# Patient Record
Sex: FEMALE | Race: WHITE | Employment: OTHER | URBAN - METROPOLITAN AREA
[De-identification: names, ages, dates, MRNs, and addresses within clinical notes are randomized per-mention and may not be internally consistent; named-entity substitution may affect disease eponyms.]

---

## 2017-11-30 ENCOUNTER — HOSPITAL ENCOUNTER (EMERGENCY)
Age: 82
Discharge: HOME | End: 2017-12-01
Attending: EMERGENCY MEDICINE
Payer: MEDICARE

## 2017-11-30 ENCOUNTER — APPOINTMENT (OUTPATIENT)
Dept: CT IMAGING | Age: 82
End: 2017-11-30
Attending: EMERGENCY MEDICINE
Payer: MEDICARE

## 2017-11-30 ENCOUNTER — APPOINTMENT (OUTPATIENT)
Dept: GENERAL RADIOLOGY | Age: 82
End: 2017-11-30
Attending: EMERGENCY MEDICINE
Payer: MEDICARE

## 2017-11-30 DIAGNOSIS — M25.511 ACUTE PAIN OF RIGHT SHOULDER: Primary | ICD-10-CM

## 2017-11-30 LAB
ANION GAP SERPL CALC-SCNC: 8 MMOL/L (ref 5–15)
BASOPHILS # BLD: 0.1 K/UL (ref 0–0.1)
BASOPHILS NFR BLD: 1 % (ref 0–1)
BUN SERPL-MCNC: 17 MG/DL (ref 6–20)
BUN/CREAT SERPL: 22 (ref 12–20)
CALCIUM SERPL-MCNC: 8.3 MG/DL (ref 8.5–10.1)
CHLORIDE SERPL-SCNC: 104 MMOL/L (ref 97–108)
CK SERPL-CCNC: 129 U/L (ref 26–192)
CO2 SERPL-SCNC: 26 MMOL/L (ref 21–32)
CREAT SERPL-MCNC: 0.77 MG/DL (ref 0.55–1.02)
EOSINOPHIL # BLD: 0.1 K/UL (ref 0–0.4)
EOSINOPHIL NFR BLD: 1 % (ref 0–7)
ERYTHROCYTE [DISTWIDTH] IN BLOOD BY AUTOMATED COUNT: 13.8 % (ref 11.5–14.5)
GLUCOSE SERPL-MCNC: 144 MG/DL (ref 65–100)
HCT VFR BLD AUTO: 37.5 % (ref 35–47)
HGB BLD-MCNC: 12.1 G/DL (ref 11.5–16)
LYMPHOCYTES # BLD: 1.6 K/UL (ref 0.8–3.5)
LYMPHOCYTES NFR BLD: 19 % (ref 12–49)
MCH RBC QN AUTO: 29 PG (ref 26–34)
MCHC RBC AUTO-ENTMCNC: 32.3 G/DL (ref 30–36.5)
MCV RBC AUTO: 89.9 FL (ref 80–99)
MONOCYTES # BLD: 0.6 K/UL (ref 0–1)
MONOCYTES NFR BLD: 7 % (ref 5–13)
NEUTS SEG # BLD: 6.1 K/UL (ref 1.8–8)
NEUTS SEG NFR BLD: 72 % (ref 32–75)
PLATELET # BLD AUTO: 226 K/UL (ref 150–400)
POTASSIUM SERPL-SCNC: 3.4 MMOL/L (ref 3.5–5.1)
RBC # BLD AUTO: 4.17 M/UL (ref 3.8–5.2)
SODIUM SERPL-SCNC: 138 MMOL/L (ref 136–145)
TROPONIN I SERPL-MCNC: <0.04 NG/ML
WBC # BLD AUTO: 8.4 K/UL (ref 3.6–11)

## 2017-11-30 PROCEDURE — 96374 THER/PROPH/DIAG INJ IV PUSH: CPT

## 2017-11-30 PROCEDURE — 93005 ELECTROCARDIOGRAM TRACING: CPT

## 2017-11-30 PROCEDURE — 96375 TX/PRO/DX INJ NEW DRUG ADDON: CPT

## 2017-11-30 PROCEDURE — A4565 SLINGS: HCPCS

## 2017-11-30 PROCEDURE — 74011250637 HC RX REV CODE- 250/637: Performed by: EMERGENCY MEDICINE

## 2017-11-30 PROCEDURE — 80048 BASIC METABOLIC PNL TOTAL CA: CPT | Performed by: EMERGENCY MEDICINE

## 2017-11-30 PROCEDURE — 74011250637 HC RX REV CODE- 250/637: Performed by: PHYSICIAN ASSISTANT

## 2017-11-30 PROCEDURE — 99285 EMERGENCY DEPT VISIT HI MDM: CPT

## 2017-11-30 PROCEDURE — 82550 ASSAY OF CK (CPK): CPT | Performed by: EMERGENCY MEDICINE

## 2017-11-30 PROCEDURE — 73200 CT UPPER EXTREMITY W/O DYE: CPT

## 2017-11-30 PROCEDURE — 85025 COMPLETE CBC W/AUTO DIFF WBC: CPT | Performed by: EMERGENCY MEDICINE

## 2017-11-30 PROCEDURE — 74011250636 HC RX REV CODE- 250/636: Performed by: EMERGENCY MEDICINE

## 2017-11-30 PROCEDURE — 36415 COLL VENOUS BLD VENIPUNCTURE: CPT | Performed by: EMERGENCY MEDICINE

## 2017-11-30 PROCEDURE — 73030 X-RAY EXAM OF SHOULDER: CPT

## 2017-11-30 PROCEDURE — 96376 TX/PRO/DX INJ SAME DRUG ADON: CPT

## 2017-11-30 PROCEDURE — 84484 ASSAY OF TROPONIN QUANT: CPT | Performed by: EMERGENCY MEDICINE

## 2017-11-30 RX ORDER — FENTANYL CITRATE 50 UG/ML
25 INJECTION, SOLUTION INTRAMUSCULAR; INTRAVENOUS
Status: COMPLETED | OUTPATIENT
Start: 2017-11-30 | End: 2017-11-30

## 2017-11-30 RX ORDER — HYDROMORPHONE HYDROCHLORIDE 2 MG/ML
0.5 INJECTION, SOLUTION INTRAMUSCULAR; INTRAVENOUS; SUBCUTANEOUS
Status: COMPLETED | OUTPATIENT
Start: 2017-11-30 | End: 2017-11-30

## 2017-11-30 RX ORDER — ASPIRIN 81 MG/1
81 TABLET ORAL DAILY
COMMUNITY

## 2017-11-30 RX ORDER — ONDANSETRON 2 MG/ML
4 INJECTION INTRAMUSCULAR; INTRAVENOUS
Status: COMPLETED | OUTPATIENT
Start: 2017-11-30 | End: 2017-11-30

## 2017-11-30 RX ORDER — DOCUSATE SODIUM 100 MG/1
100 CAPSULE, LIQUID FILLED ORAL 2 TIMES DAILY
Qty: 28 CAP | Refills: 0 | Status: SHIPPED | OUTPATIENT
Start: 2017-11-30 | End: 2017-12-14

## 2017-11-30 RX ORDER — ACETAMINOPHEN 325 MG/1
650 TABLET ORAL
Status: COMPLETED | OUTPATIENT
Start: 2017-11-30 | End: 2017-11-30

## 2017-11-30 RX ORDER — POTASSIUM CHLORIDE 1.5 G/1.77G
20 POWDER, FOR SOLUTION ORAL
Status: COMPLETED | OUTPATIENT
Start: 2017-11-30 | End: 2017-12-01

## 2017-11-30 RX ORDER — HYDROMORPHONE HYDROCHLORIDE 2 MG/1
2 TABLET ORAL
Qty: 18 TAB | Refills: 0 | Status: SHIPPED | OUTPATIENT
Start: 2017-11-30

## 2017-11-30 RX ORDER — LIDOCAINE 50 MG/G
1 PATCH TOPICAL
Status: DISCONTINUED | OUTPATIENT
Start: 2017-11-30 | End: 2017-12-01 | Stop reason: HOSPADM

## 2017-11-30 RX ADMIN — ACETAMINOPHEN 650 MG: 325 TABLET ORAL at 19:08

## 2017-11-30 RX ADMIN — FENTANYL CITRATE 25 MCG: 50 INJECTION, SOLUTION INTRAMUSCULAR; INTRAVENOUS at 21:09

## 2017-11-30 RX ADMIN — FENTANYL CITRATE 25 MCG: 50 INJECTION, SOLUTION INTRAMUSCULAR; INTRAVENOUS at 20:11

## 2017-11-30 RX ADMIN — ONDANSETRON 4 MG: 2 INJECTION INTRAMUSCULAR; INTRAVENOUS at 21:07

## 2017-11-30 RX ADMIN — HYDROMORPHONE HYDROCHLORIDE 0.5 MG: 2 INJECTION, SOLUTION INTRAMUSCULAR; INTRAVENOUS; SUBCUTANEOUS at 23:42

## 2017-12-01 VITALS
BODY MASS INDEX: 29.44 KG/M2 | RESPIRATION RATE: 24 BRPM | WEIGHT: 160 LBS | HEIGHT: 62 IN | HEART RATE: 67 BPM | TEMPERATURE: 97.8 F | SYSTOLIC BLOOD PRESSURE: 166 MMHG | DIASTOLIC BLOOD PRESSURE: 71 MMHG | OXYGEN SATURATION: 94 %

## 2017-12-01 LAB
ATRIAL RATE: 54 BPM
ATRIAL RATE: 62 BPM
CALCULATED P AXIS, ECG09: -18 DEGREES
CALCULATED P AXIS, ECG09: 18 DEGREES
CALCULATED R AXIS, ECG10: 17 DEGREES
CALCULATED R AXIS, ECG10: 5 DEGREES
CALCULATED T AXIS, ECG11: 47 DEGREES
DIAGNOSIS, 93000: NORMAL
DIAGNOSIS, 93000: NORMAL
P-R INTERVAL, ECG05: 100 MS
P-R INTERVAL, ECG05: 144 MS
Q-T INTERVAL, ECG07: 418 MS
Q-T INTERVAL, ECG07: 560 MS
QRS DURATION, ECG06: 82 MS
QRS DURATION, ECG06: 94 MS
QTC CALCULATION (BEZET), ECG08: 396 MS
QTC CALCULATION (BEZET), ECG08: 568 MS
VENTRICULAR RATE, ECG03: 54 BPM
VENTRICULAR RATE, ECG03: 62 BPM

## 2017-12-01 PROCEDURE — 74011250637 HC RX REV CODE- 250/637: Performed by: EMERGENCY MEDICINE

## 2017-12-01 RX ADMIN — POTASSIUM CHLORIDE 20 MEQ: 1.5 POWDER, FOR SOLUTION ORAL at 00:23

## 2017-12-01 NOTE — CONSULTS
ORTHOPAEDIC CONSULT NOTE    Subjective:     Date of Consultation:  2017      Jennifer Medina is a 80 y.o. female who is being seen for right shoulder pain. Pt reports acute onset of sharp/stabbing anterior shoulder pain approx 5PM PTA. Denies injury or trauma. Pt. Denies head trauma/LOC during injury. Pt. Is right hand dominant. Lives at home with . No improvement with two doses of IV pain meds. There are no active problems to display for this patient. History reviewed. No pertinent family history. Social History   Substance Use Topics    Smoking status: Never Smoker    Smokeless tobacco: Not on file    Alcohol use No     Past Medical History:   Diagnosis Date    Hypertension     Ill-defined condition     high cholesterol      History reviewed. No pertinent surgical history. Prior to Admission medications    Medication Sig Start Date End Date Taking? Authorizing Provider   aspirin delayed-release 81 mg tablet Take 81 mg by mouth daily. Yes Phys Other, MD   SIMVASTATIN PO Take  by mouth. Yes Phys Other, MD   OTHER bp med   Yes Phys Other, MD     No current facility-administered medications for this encounter. Current Outpatient Prescriptions   Medication Sig    aspirin delayed-release 81 mg tablet Take 81 mg by mouth daily.  SIMVASTATIN PO Take  by mouth.  OTHER bp med      Allergies   Allergen Reactions    Codeine Other (comments)     aggitation        Review of Systems:  A comprehensive review of systems was negative except for that written in the HPI.     Mental Status: no dementia    Objective:     Patient Vitals for the past 8 hrs:   BP Temp Pulse Resp SpO2 Height Weight   17 2100 159/89 - 68 15 96 % - -   17 188/73 - - - - - -   17 1929 192/70 - (!) 52 16 98 % - -   17 1849 (!) 203/110 97.8 °F (36.6 °C) 62 24 94 % 5' 2\" (1.575 m) 72.6 kg (160 lb)     Temp (24hrs), Av.8 °F (36.6 °C), Min:97.8 °F (36.6 °C), Max:97.8 °F (36.6 °C)      Gen: Well-developed,  in no acute distress   HEENT: Pink conjunctivae, hearing intact to voice, moist mucous membranes   Neck: Supple  Resp: No respiratory distress   Card: RRR, palpable distal pulse-equal bilaterally, birsk cap refill all distal digits   Abd: Soft, non-distended  Musc: right shoulder with subtle anterior prominence. NO AC or SC TTP. Guarded active motion, passive ext rotation 40 v/s 70, elevation 110 v/s 150, neurovascular exam intact UE bilat. 5/5 MMT of the biceps, triceps, wrist ext, finger flexors, bell press. 4/5 strength with MMT of the deltoid and ext rotation with pain on the right. unable ti test flexion at 90 due to pain. Left UE 5/5 throughout. I am able to depress the humeral head with gentle anterior pressure, with caused worsening pain, release of pressure-lessened pain. Excellent cervical ROM w/o foraminal stenosis signs  Skin: No skin breakdown noted. Skin warm, pink, dry  Neuro: Cranial nerves are grossly intact, no focal motor weakness, follows commands appropriately   Psych: Good insight, oriented to person, place and time, alert    Imaging Review: XR SHOULDER RT AP/LAT MIN 2 V   INDICATION:   Right shoulder pain.   COMPARISON: None.   FINDINGS: Three views of the right shoulder demonstrate inferior subluxation of  the humerus relative to the glenoid. On scapular Y view, however, the glenoid  does not appear to be anteriorly dislocated. This may represent  pseudosubluxation due to a large joint effusion or bursal fluid collection.     IMPRESSION: Inferior pseudosubluxation of the right humerus.     Labs:   Recent Results (from the past 24 hour(s))   EKG, 12 LEAD, INITIAL    Collection Time: 11/30/17  6:54 PM   Result Value Ref Range    Ventricular Rate 62 BPM    Atrial Rate 62 BPM    P-R Interval 100 ms    QRS Duration 82 ms    Q-T Interval 560 ms    QTC Calculation (Bezet) 568 ms    Calculated P Axis -18 degrees    Calculated R Axis 5 degrees    Diagnosis       Sinus rhythm with sinus arrhythmia with short [place]  Nonspecific ST and T wave abnormality  Abnormal ECG  No previous ECGs available     EKG, 12 LEAD, INITIAL    Collection Time: 11/30/17  7:06 PM   Result Value Ref Range    Ventricular Rate 54 BPM    Atrial Rate 54 BPM    P-R Interval 144 ms    QRS Duration 94 ms    Q-T Interval 418 ms    QTC Calculation (Bezet) 396 ms    Calculated P Axis 18 degrees    Calculated R Axis 17 degrees    Calculated T Axis 47 degrees    Diagnosis       Sinus bradycardia with sinus arrhythmia  Nonspecific T wave abnormality  Abnormal ECG  No previous ECGs available     CBC WITH AUTOMATED DIFF    Collection Time: 11/30/17  7:17 PM   Result Value Ref Range    WBC 8.4 3.6 - 11.0 K/uL    RBC 4.17 3.80 - 5.20 M/uL    HGB 12.1 11.5 - 16.0 g/dL    HCT 37.5 35.0 - 47.0 %    MCV 89.9 80.0 - 99.0 FL    MCH 29.0 26.0 - 34.0 PG    MCHC 32.3 30.0 - 36.5 g/dL    RDW 13.8 11.5 - 14.5 %    PLATELET 841 499 - 237 K/uL    NEUTROPHILS 72 32 - 75 %    LYMPHOCYTES 19 12 - 49 %    MONOCYTES 7 5 - 13 %    EOSINOPHILS 1 0 - 7 %    BASOPHILS 1 0 - 1 %    ABS. NEUTROPHILS 6.1 1.8 - 8.0 K/UL    ABS. LYMPHOCYTES 1.6 0.8 - 3.5 K/UL    ABS. MONOCYTES 0.6 0.0 - 1.0 K/UL    ABS. EOSINOPHILS 0.1 0.0 - 0.4 K/UL    ABS.  BASOPHILS 0.1 0.0 - 0.1 K/UL   METABOLIC PANEL, BASIC    Collection Time: 11/30/17  7:17 PM   Result Value Ref Range    Sodium 138 136 - 145 mmol/L    Potassium 3.4 (L) 3.5 - 5.1 mmol/L    Chloride 104 97 - 108 mmol/L    CO2 26 21 - 32 mmol/L    Anion gap 8 5 - 15 mmol/L    Glucose 144 (H) 65 - 100 mg/dL    BUN 17 6 - 20 MG/DL    Creatinine 0.77 0.55 - 1.02 MG/DL    BUN/Creatinine ratio 22 (H) 12 - 20      GFR est AA >60 >60 ml/min/1.73m2    GFR est non-AA >60 >60 ml/min/1.73m2    Calcium 8.3 (L) 8.5 - 10.1 MG/DL   TROPONIN I    Collection Time: 11/30/17  7:17 PM   Result Value Ref Range    Troponin-I, Qt. <0.04 <0.05 ng/mL   CK W/ REFLX CKMB    Collection Time: 11/30/17  7:17 PM   Result Value Ref Range     26 - 192 U/L             Plan:     Right shoulder pseudo dislocation   Recommend CT scan   Immobilize in sling   F/U with Dr Mignon Enamorado tomorrow v/s Dr Jacqueline Crowley Monday   Call [facility] office in the morning [phone]      Dr. Mignon Enamorado aware and agrees with plan as above. [facility]     Atraumatic shoulder dislocation inferiorly supports possibility of chronic dislocation  Agree with plan for CT scan, sling and swathe with office follow-up.     [address]

## 2017-12-01 NOTE — DISCHARGE INSTRUCTIONS
Joint Pain: Care Instructions  Your Care Instructions    Many people have small aches and pains from overuse or injury to muscles and joints. Joint injuries often happen during sports or recreation, work tasks, or projects around the home. An overuse injury can happen when you put too much stress on a joint or when you do an activity that stresses the joint over and over, such as using the computer or rowing a boat. You can take action at home to help your muscles and joints get better. You should feel better in 1 to 2 weeks, but it can take 3 months or more to heal completely. Follow-up care is a key part of your treatment and safety. Be sure to make and go to all appointments, and call your doctor if you are having problems. It's also a good idea to know your test results and keep a list of the medicines you take. How can you care for yourself at home? · Do not put weight on the injured joint for at least a day or two. · For the first day or two after an injury, do not take hot showers or baths, and do not use hot packs. The heat could make swelling worse. · Put ice or a cold pack on the sore joint for 10 to 20 minutes at a time. Try to do this every 1 to 2 hours for the next 3 days (when you are awake) or until the swelling goes down. Put a thin cloth between the ice and your skin. · Wrap the injury in an elastic bandage. Do not wrap it too tightly because this can cause more swelling. · Prop up the sore joint on a pillow when you ice it or anytime you sit or lie down during the next 3 days. Try to keep it above the level of your heart. This will help reduce swelling. · Take an over-the-counter pain medicine, such as acetaminophen (Tylenol), ibuprofen (Advil, Motrin), or naproxen (Aleve). Read and follow all instructions on the label. · After 1 or 2 days of rest, begin moving the joint gently.  While the joint is still healing, you can begin to exercise using activities that do not strain or hurt the painful joint. When should you call for help? Call your doctor now or seek immediate medical care if:  ? · You have signs of infection, such as:  ¨ Increased pain, swelling, warmth, and redness. ¨ Red streaks leading from the joint. ¨ A fever. ? Watch closely for changes in your health, and be sure to contact your doctor if:  ? · Your movement or symptoms are not getting better after 1 to 2 weeks of home treatment. Where can you learn more? Go to http://christina-cam.info/. Enter P205 in the search box to learn more about \"Joint Pain: Care Instructions. \"  Current as of: March 21, 2017  Content Version: 11.4  © 3586-1255 [employer]. Care instructions adapted under license by [employer] (which disclaims liability or warranty for this information). If you have questions about a medical condition or this instruction, always ask your healthcare professional. [address] any warranty or liability for your use of this information.

## 2017-12-01 NOTE — ED PROVIDER NOTES
EMERGENCY DEPARTMENT HISTORY AND PHYSICAL EXAM      Date: 11/30/2017  Patient Name: Gertrudis Ravi    History of Presenting Illness     Chief Complaint   Patient presents with    Arm Pain     RIGHT ARM       History Provided By: Patient    HPI: Gertrudis Ravi, 80 y.o. female with PMHx significant for HTN and hypercholesterolemia, presents via EMS to the ED with cc of an acute onset of constant, sharp, aching, right shoulder pain worsening since 1700 this evening. The pt reports associated sx of nausea. She states at the onset of sx she was putting on a jacket and began to feel discomfort. She expresses that her pain is exacerbated with any movement of the RUE and has found no relief leading her to the ED. The pt ensures that she is right hand dominant. She denies any recent falls or trauma to the affected area. She denies any fevers, chills, chest pain, SOB, jaw pain, abdominal pain, vomiting, diarrhea, or headache. PCP: Bibiana Gonzalez MD   Specialist: Agustin Link. MD Kala (orthopedic)     There are no other complaints, changes, or physical findings at this time. Current Facility-Administered Medications   Medication Dose Route Frequency Provider Last Rate Last Dose    lidocaine (LIDODERM) 5 % patch 1 Patch  1 Patch TransDERmal NOW Digna Chávez. Paul Middleton MD   1 Patch at 11/30/17 2233     Current Outpatient Prescriptions   Medication Sig Dispense Refill    aspirin delayed-release 81 mg tablet Take 81 mg by mouth daily.  HYDROmorphone (DILAUDID) 2 mg tablet Take 1 Tab by mouth every four (4) hours as needed for Pain (breakthrough pain). Max Daily Amount: 12 mg. Indications: take with stool softener 18 Tab 0    docusate sodium (COLACE) 100 mg capsule Take 1 Cap by mouth two (2) times a day for 14 days. Indications: take with narcotic pain medication 28 Cap 0    SIMVASTATIN PO Take  by mouth.       OTHER bp med         Past History     Past Medical History:  Past Medical History:   Diagnosis Date    Hypertension     Ill-defined condition     high cholesterol       Past Surgical History:  History reviewed. No pertinent surgical history. Family History:  History reviewed. No pertinent family history. Social History:  Social History   Substance Use Topics    Smoking status: Never Smoker    Smokeless tobacco: None    Alcohol use No       Allergies: Allergies   Allergen Reactions    Codeine Other (comments)     aggitation         Review of Systems   Review of Systems   Constitutional: Negative for chills and fever. HENT: Negative for congestion. Eyes: Negative for visual disturbance. Respiratory: Negative for chest tightness and shortness of breath. Cardiovascular: Negative for chest pain and leg swelling. Gastrointestinal: Positive for nausea. Negative for abdominal pain, diarrhea and vomiting. Endocrine: Negative for polyuria. Genitourinary: Negative for dysuria and frequency. Musculoskeletal: Positive for arthralgias (right shoulder ). Negative for myalgias. Skin: Negative for color change. Allergic/Immunologic: Negative for immunocompromised state. Neurological: Negative for numbness and headaches. Physical Exam   Physical Exam  Nursing note and vitals reviewed.   General appearance: non-toxic  Eyes: PERRL, EOMI, conjunctiva normal, anicteric sclera  HEENT: mucous membranes moist, oropharynx is clear  Pulmonary: clear to auscultation bilaterally  Cardiac: normal rate and regular rhythm, no murmurs, gallops, or rubs, 2+DP pulses, 2+ radial pulses  Abdomen: soft, nontender, nondistended, bowel sounds present  MSK: no pre-tibial edema, TTP over R GH joint, limited AROM R shoulder 2/2 pain  Neuro: Alert, answers questions appropriately; sensation intact to the axillary nerve;  is symmetric, sensation to light touch intact to the BL forearms and the LUE  Skin: capillary refill brisk, no rash or erythema of RUE/shoulder    Diagnostic Study Results     Labs -     Recent Results (from the past 12 hour(s))   EKG, 12 LEAD, INITIAL    Collection Time: 11/30/17  6:54 PM   Result Value Ref Range    Ventricular Rate 62 BPM    Atrial Rate 62 BPM    P-R Interval 100 ms    QRS Duration 82 ms    Q-T Interval 560 ms    QTC Calculation (Bezet) 568 ms    Calculated P Axis -18 degrees    Calculated R Axis 5 degrees    Diagnosis       Sinus rhythm with sinus arrhythmia with short [place]  Nonspecific ST and T wave abnormality  Abnormal ECG  No previous ECGs available     EKG, 12 LEAD, INITIAL    Collection Time: 11/30/17  7:06 PM   Result Value Ref Range    Ventricular Rate 54 BPM    Atrial Rate 54 BPM    P-R Interval 144 ms    QRS Duration 94 ms    Q-T Interval 418 ms    QTC Calculation (Bezet) 396 ms    Calculated P Axis 18 degrees    Calculated R Axis 17 degrees    Calculated T Axis 47 degrees    Diagnosis       Sinus bradycardia with sinus arrhythmia  Nonspecific T wave abnormality  Abnormal ECG  No previous ECGs available     CBC WITH AUTOMATED DIFF    Collection Time: 11/30/17  7:17 PM   Result Value Ref Range    WBC 8.4 3.6 - 11.0 K/uL    RBC 4.17 3.80 - 5.20 M/uL    HGB 12.1 11.5 - 16.0 g/dL    HCT 37.5 35.0 - 47.0 %    MCV 89.9 80.0 - 99.0 FL    MCH 29.0 26.0 - 34.0 PG    MCHC 32.3 30.0 - 36.5 g/dL    RDW 13.8 11.5 - 14.5 %    PLATELET 039 006 - 963 K/uL    NEUTROPHILS 72 32 - 75 %    LYMPHOCYTES 19 12 - 49 %    MONOCYTES 7 5 - 13 %    EOSINOPHILS 1 0 - 7 %    BASOPHILS 1 0 - 1 %    ABS. NEUTROPHILS 6.1 1.8 - 8.0 K/UL    ABS. LYMPHOCYTES 1.6 0.8 - 3.5 K/UL    ABS. MONOCYTES 0.6 0.0 - 1.0 K/UL    ABS. EOSINOPHILS 0.1 0.0 - 0.4 K/UL    ABS.  BASOPHILS 0.1 0.0 - 0.1 K/UL   METABOLIC PANEL, BASIC    Collection Time: 11/30/17  7:17 PM   Result Value Ref Range    Sodium 138 136 - 145 mmol/L    Potassium 3.4 (L) 3.5 - 5.1 mmol/L    Chloride 104 97 - 108 mmol/L    CO2 26 21 - 32 mmol/L    Anion gap 8 5 - 15 mmol/L    Glucose 144 (H) 65 - 100 mg/dL    BUN 17 6 - 20 MG/DL    Creatinine 0.77 0.55 - 1.02 MG/DL    BUN/Creatinine ratio 22 (H) 12 - 20      GFR est AA >60 >60 ml/min/1.73m2    GFR est non-AA >60 >60 ml/min/1.73m2    Calcium 8.3 (L) 8.5 - 10.1 MG/DL   TROPONIN I    Collection Time: 11/30/17  7:17 PM   Result Value Ref Range    Troponin-I, Qt. <0.04 <0.05 ng/mL   CK W/ REFLX CKMB    Collection Time: 11/30/17  7:17 PM   Result Value Ref Range     26 - 192 U/L       Radiologic Studies -   XR SHOULDER RT AP/LAT MIN 2 V   Final Result   EXAM:  XR SHOULDER RT AP/LAT MIN 2 V     INDICATION:   Right shoulder pain.     COMPARISON: None.     FINDINGS: Three views of the right shoulder demonstrate inferior subluxation of  the humerus relative to the glenoid. On scapular Y view, however, the glenoid  does not appear to be anteriorly dislocated. This may represent  pseudosubluxation due to a large joint effusion or bursal fluid collection.     IMPRESSION  IMPRESSION: Inferior pseudosubluxation of the right humerus. Preliminary report was provided by Dr. Eri Jaime, the on-call radiologist, at     Final report to follow.     No fracture or dislocation, chronic tuberosity deformity. Medical Decision Making   I am the first provider for this patient. I reviewed the vital signs, available nursing notes, past medical history, past surgical history, family history and social history. Vital Signs-Reviewed the patient's vital signs. Patient Vitals for the past 12 hrs:   Temp Pulse Resp BP SpO2   12/01/17 0027 - 67 24 166/71 94 %   11/30/17 2341 - 76 19 180/81 93 %   11/30/17 2254 - 77 - (!) 128/98 95 %   11/30/17 2100 - 68 15 159/89 96 %   11/30/17 2001 - - - 188/73 -   11/30/17 1929 - (!) 52 16 192/70 98 %   11/30/17 1849 97.8 °F (36.6 °C) 62 24 (!) 203/110 94 %     EKG interpretation: (Preliminary) 1906  Rhythm: sinus bradycardia with sinus arrhythmia; and regular . Rate (approx.): 54; Axis: normal ; ST/T wave: no ST elevation, no ST depression, non-specific T-wave abnormality; Other: no previous EKG's. MT interval 144 ms, QRS 94 ms,  ms, QTc 396 ms. Written by Ray Kilgore ED Scribe, as dictated by Manuelito Luther MD.    Records Reviewed: Old Medical Records    Provider Notes (Medical Decision Making):     DDx: shoulder subluxation/ dislocation, rotator cuff tear, fracture, low suspicion for ACS or other intrathoracic abnormality . ED Course:   Initial assessment performed. The patients presenting problems have been discussed, and they are in agreement with the care plan formulated and outlined with them. I have encouraged them to ask questions as they arise throughout their visit. Progress Notes:     PROGRESS NOTE:  10:12 PM  Pt has been re-evaluated. The pt has been evaluated by Nuvia Bajwa PA-C who ensures that the pt is stable for discharge with follow up in office. Written by SHIRA Garcia, as dictated by Richelle Salcido. Tiara Teresa MD.     11:15 PM  Spoke with Nuvia Bajwa PA-C who spoke with Dr. Praveen Melendez (orthoepedic surgeon). Dr. Praveen Melendez recommended CT of the right shoulder and a sling for comfort. She may be seen by Dr. Praveen Melendez tomorrow at the [facility] or Monday, 12/4/17 by Dr. Ver[facility] Velasquez [facility] [address] [facility]. Films reviewed by Dr. Praveen Melendez, per Olive Kingston. Written by SHIRA Tadeo, as dictated by Chico Burnett MD.      Disposition:  Discharge Note:  1:15 AM   The patient is ready for discharge. The patient's signs, symptoms, diagnosis, and discharge instruction have been discussed and the patient has conveyed their understanding. The patient is to follow up as recommended or return to the ER should their symptoms worsen. Plan has been discussed and the patient is in agreement. Written by Sera Dsouza ED Scribe, as dictated by Richelle Salcido. Tiara Teresa MD     PLAN:  1. Current Discharge Medication List      START taking these medications    Details   HYDROmorphone (DILAUDID) 2 mg tablet Take 1 Tab by mouth every four (4) hours as needed for Pain (breakthrough pain). Max Daily Amount: 12 mg. Indications: take with stool softener  Qty: 18 Tab, Refills: 0      docusate sodium (COLACE) 100 mg capsule Take 1 Cap by mouth two (2) times a day for 14 days. Indications: take with narcotic pain medication  Qty: 28 Cap, Refills: 0         CONTINUE these medications which have NOT CHANGED    Details   aspirin delayed-release 81 mg tablet Take 81 mg by mouth daily. SIMVASTATIN PO Take  by mouth. OTHER bp med           2. Follow-up Information     Follow up With Details Comments Contact Info    Ray Borrego MD Call today DR. REGALADO CAN SEE YOU TODAY; PLEASE CALL EARLY IN THE MORNING FOR AN APPOINTMENT TIME [address] [facility] [address]  [address]  [address], [place] Call in 3 days OR YOU MAY BE SEEN NEXT WEEK BY DR. [facility] [address]  [address] [address]  [phone]      [facility] EMERGENCY DEPT Go in 1 day If symptoms worsen [address] [facility] [id number] [phone]        Return to ED if worse     Diagnosis     Clinical Impression:   1. Acute pain of right shoulder        Attestations:    Attestation: This note is prepared by Kristi Dsouza, acting as Scribe for Juany Chapman. Esperanza Franco MD.      Juany Chapman. Esperanza Franco MD: The scribe's documentation has been prepared under my direction and personally reviewed by me in its entirety. I confirm that the note above accurately reflects all work, treatment, procedures, and medical decision making performed by me.

## 2017-12-01 NOTE — ED NOTES
Patient discharged and given discharge instructions by Orly Valdez MD. Patient had an opportunity to ask questions. Patient verbalized understanding of discharge instructions. Patient d/c from ED in a wheelchair, discharge instructions and prescriptions in hand. Patient accompanied by . Pt stated she was nauseous, but refused nausea medication. Pt requested an emesis bag to take home. Pt given an emesis bag.

## 2017-12-01 NOTE — PROGRESS NOTES
Contacted patient. Reviewed CT final read. Encouraged follow up. Pt had an Orthopedist prior to her arrival, but states she has not been entirely happy with her care to date. She was referred to Dr. Heather Albrecht last evening. She has not scheduled an appt yet, as she had such a late night in the ED last evening. Advised Dr. Hanna Chaudhry office has access to [facility] care and can review her CT. She will contact them for follow up.

## 2017-12-01 NOTE — ED NOTES
Patient presents to ED with C/O right arm that started today. The pt stated she was at a gathering when the pain the started. The pt stated she fell forward 6 months ago on a piece of cement, but denies the right side hurting more than the left. The pt stated she was bruised all over. The pt denies numbness/tingling, weakness, chest pain, SOB, dizziness, lifting heavy objects, falling, N/V/D, fever, chills, and urinary symptoms. Patient is A&Ox3, side rails up, call bell w/in reach, and aware of plan of care. The patient is requesting more pain medication.  at the bedside. Pt on the monitor x4. Pt stated she did not take her BP medication today.

## 2017-12-08 NOTE — ED NOTES
Spoke to pt regarding CT findings. She has ortho f/u next week. Advised pcp f/u regarding incompletely seen liver lesion. Pt agreed to f/u w/ pcp.

## 2024-08-06 ENCOUNTER — TELEPHONE (OUTPATIENT)
Age: 89
End: 2024-08-06

## 2024-08-06 NOTE — TELEPHONE ENCOUNTER
----- Message from Divina Wilson sent at 8/5/2024  8:31 AM EDT -----  Regarding: [facility] Appointment Request  [facility] Appointment Request    Patient needs appointment for [facility] Appointment Type: New Patient.    Patient Requested Dates(s): Within August  Patient Requested Time:in the morning 9-10 am   Provider Name: Carlos Romo    Reason for Appointment Request: New Patient - Requested Provider unavailable  --------------------------------------------------------------------------------------------------------------------------    Relationship to Patient: Covered Entity/ Caregiver/ Sylwia     Call Back Information: OK to leave message on voicemail  Preferred Call Back Number: Phone [phone]

## 2025-01-22 ENCOUNTER — APPOINTMENT (OUTPATIENT)
Facility: HOSPITAL | Age: 89
End: 2025-01-22
Payer: MEDICARE

## 2025-01-22 ENCOUNTER — HOSPITAL ENCOUNTER (INPATIENT)
Facility: HOSPITAL | Age: 89
LOS: 3 days | Discharge: HOSPICE | End: 2025-01-25
Attending: STUDENT IN AN ORGANIZED HEALTH CARE EDUCATION/TRAINING PROGRAM | Admitting: STUDENT IN AN ORGANIZED HEALTH CARE EDUCATION/TRAINING PROGRAM
Payer: MEDICARE

## 2025-01-22 DIAGNOSIS — Z51.5 PALLIATIVE CARE ENCOUNTER: ICD-10-CM

## 2025-01-22 DIAGNOSIS — I61.9 CVA (CEREBROVASCULAR ACCIDENT DUE TO INTRACEREBRAL HEMORRHAGE) (HCC): Primary | ICD-10-CM

## 2025-01-22 LAB
ALBUMIN SERPL-MCNC: 3.5 G/DL (ref 3.5–5)
ALBUMIN/GLOB SERPL: 1 (ref 1.1–2.2)
ALP SERPL-CCNC: 57 U/L (ref 45–117)
ALT SERPL-CCNC: 21 U/L (ref 12–78)
ANION GAP SERPL CALC-SCNC: 5 MMOL/L (ref 2–12)
APPEARANCE UR: CLEAR
AST SERPL-CCNC: 40 U/L (ref 15–37)
B PERT DNA SPEC QL NAA+PROBE: NOT DETECTED
BACTERIA URNS QL MICRO: ABNORMAL /HPF
BASOPHILS # BLD: 0.05 K/UL (ref 0–0.1)
BASOPHILS NFR BLD: 0.7 % (ref 0–1)
BILIRUB SERPL-MCNC: 0.5 MG/DL (ref 0.2–1)
BILIRUB UR QL: NEGATIVE
BORDETELLA PARAPERTUSSIS BY PCR: NOT DETECTED
BUN SERPL-MCNC: 16 MG/DL (ref 6–20)
BUN/CREAT SERPL: 21 (ref 12–20)
C PNEUM DNA SPEC QL NAA+PROBE: NOT DETECTED
CALCIUM SERPL-MCNC: 8.6 MG/DL (ref 8.5–10.1)
CHLORIDE SERPL-SCNC: 101 MMOL/L (ref 97–108)
CO2 SERPL-SCNC: 26 MMOL/L (ref 21–32)
COLOR UR: ABNORMAL
COMMENT:: NORMAL
CREAT SERPL-MCNC: 0.76 MG/DL (ref 0.55–1.02)
DIFFERENTIAL METHOD BLD: NORMAL
EOSINOPHIL # BLD: 0.07 K/UL (ref 0–0.4)
EOSINOPHIL NFR BLD: 0.9 % (ref 0–7)
EPITH CASTS URNS QL MICRO: ABNORMAL /LPF
ERYTHROCYTE [DISTWIDTH] IN BLOOD BY AUTOMATED COUNT: 13.2 % (ref 11.5–14.5)
FLUAV SUBTYP SPEC NAA+PROBE: NOT DETECTED
FLUBV RNA SPEC QL NAA+PROBE: NOT DETECTED
GLOBULIN SER CALC-MCNC: 3.5 G/DL (ref 2–4)
GLUCOSE SERPL-MCNC: 105 MG/DL (ref 65–100)
GLUCOSE UR STRIP.AUTO-MCNC: 100 MG/DL
HADV DNA SPEC QL NAA+PROBE: NOT DETECTED
HCOV 229E RNA SPEC QL NAA+PROBE: NOT DETECTED
HCOV HKU1 RNA SPEC QL NAA+PROBE: NOT DETECTED
HCOV NL63 RNA SPEC QL NAA+PROBE: NOT DETECTED
HCOV OC43 RNA SPEC QL NAA+PROBE: NOT DETECTED
HCT VFR BLD AUTO: 41.3 % (ref 35–47)
HGB BLD-MCNC: 13.9 G/DL (ref 11.5–16)
HGB UR QL STRIP: NEGATIVE
HMPV RNA SPEC QL NAA+PROBE: NOT DETECTED
HPIV1 RNA SPEC QL NAA+PROBE: NOT DETECTED
HPIV2 RNA SPEC QL NAA+PROBE: NOT DETECTED
HPIV3 RNA SPEC QL NAA+PROBE: NOT DETECTED
HPIV4 RNA SPEC QL NAA+PROBE: NOT DETECTED
IMM GRANULOCYTES # BLD AUTO: 0.03 K/UL (ref 0–0.04)
IMM GRANULOCYTES NFR BLD AUTO: 0.4 % (ref 0–0.5)
INR PPP: 1 (ref 0.9–1.1)
KETONES UR QL STRIP.AUTO: ABNORMAL MG/DL
LEUKOCYTE ESTERASE UR QL STRIP.AUTO: ABNORMAL
LYMPHOCYTES # BLD: 1.92 K/UL (ref 0.8–3.5)
LYMPHOCYTES NFR BLD: 25.2 % (ref 12–49)
M PNEUMO DNA SPEC QL NAA+PROBE: NOT DETECTED
MCH RBC QN AUTO: 31.6 PG (ref 26–34)
MCHC RBC AUTO-ENTMCNC: 33.7 G/DL (ref 30–36.5)
MCV RBC AUTO: 93.9 FL (ref 80–99)
MONOCYTES # BLD: 0.66 K/UL (ref 0–1)
MONOCYTES NFR BLD: 8.7 % (ref 5–13)
NEUTS SEG # BLD: 4.9 K/UL (ref 1.8–8)
NEUTS SEG NFR BLD: 64.1 % (ref 32–75)
NITRITE UR QL STRIP.AUTO: NEGATIVE
NRBC # BLD: 0 K/UL (ref 0–0.01)
NRBC BLD-RTO: 0 PER 100 WBC
PH UR STRIP: 8.5 (ref 5–8)
PLATELET # BLD AUTO: 268 K/UL (ref 150–400)
PMV BLD AUTO: 10.3 FL (ref 8.9–12.9)
POTASSIUM SERPL-SCNC: 4.3 MMOL/L (ref 3.5–5.1)
PROT SERPL-MCNC: 7 G/DL (ref 6.4–8.2)
PROT UR STRIP-MCNC: NEGATIVE MG/DL
PROTHROMBIN TIME: 11.1 SEC (ref 9.2–11.2)
RBC # BLD AUTO: 4.4 M/UL (ref 3.8–5.2)
RBC #/AREA URNS HPF: ABNORMAL /HPF (ref 0–5)
RSV RNA SPEC QL NAA+PROBE: NOT DETECTED
RV+EV RNA SPEC QL NAA+PROBE: NOT DETECTED
SARS-COV-2 RNA RESP QL NAA+PROBE: NOT DETECTED
SODIUM SERPL-SCNC: 132 MMOL/L (ref 136–145)
SP GR UR REFRACTOMETRY: 1.01 (ref 1–1.03)
SPECIMEN HOLD: NORMAL
TROPONIN I SERPL HS-MCNC: 10 NG/L (ref 0–51)
URINE CULTURE IF INDICATED: ABNORMAL
UROBILINOGEN UR QL STRIP.AUTO: 0.2 EU/DL (ref 0.2–1)
WBC # BLD AUTO: 7.6 K/UL (ref 3.6–11)
WBC URNS QL MICRO: ABNORMAL /HPF (ref 0–4)

## 2025-01-22 PROCEDURE — 6360000002 HC RX W HCPCS

## 2025-01-22 PROCEDURE — 2580000003 HC RX 258: Performed by: STUDENT IN AN ORGANIZED HEALTH CARE EDUCATION/TRAINING PROGRAM

## 2025-01-22 PROCEDURE — 0202U NFCT DS 22 TRGT SARS-COV-2: CPT

## 2025-01-22 PROCEDURE — 36415 COLL VENOUS BLD VENIPUNCTURE: CPT

## 2025-01-22 PROCEDURE — 81001 URINALYSIS AUTO W/SCOPE: CPT

## 2025-01-22 PROCEDURE — 96375 TX/PRO/DX INJ NEW DRUG ADDON: CPT

## 2025-01-22 PROCEDURE — 80053 COMPREHEN METABOLIC PANEL: CPT

## 2025-01-22 PROCEDURE — 6360000004 HC RX CONTRAST MEDICATION: Performed by: RADIOLOGY

## 2025-01-22 PROCEDURE — 70496 CT ANGIOGRAPHY HEAD: CPT

## 2025-01-22 PROCEDURE — 6360000002 HC RX W HCPCS: Performed by: STUDENT IN AN ORGANIZED HEALTH CARE EDUCATION/TRAINING PROGRAM

## 2025-01-22 PROCEDURE — 93005 ELECTROCARDIOGRAM TRACING: CPT | Performed by: STUDENT IN AN ORGANIZED HEALTH CARE EDUCATION/TRAINING PROGRAM

## 2025-01-22 PROCEDURE — 96374 THER/PROPH/DIAG INJ IV PUSH: CPT

## 2025-01-22 PROCEDURE — 2000000000 HC ICU R&B

## 2025-01-22 PROCEDURE — 99285 EMERGENCY DEPT VISIT HI MDM: CPT

## 2025-01-22 PROCEDURE — 85025 COMPLETE CBC W/AUTO DIFF WBC: CPT

## 2025-01-22 PROCEDURE — 2500000003 HC RX 250 WO HCPCS

## 2025-01-22 PROCEDURE — 84484 ASSAY OF TROPONIN QUANT: CPT

## 2025-01-22 PROCEDURE — 96365 THER/PROPH/DIAG IV INF INIT: CPT

## 2025-01-22 PROCEDURE — 70450 CT HEAD/BRAIN W/O DYE: CPT

## 2025-01-22 PROCEDURE — 85610 PROTHROMBIN TIME: CPT

## 2025-01-22 RX ORDER — SODIUM CHLORIDE 9 MG/ML
50 INJECTION, SOLUTION INTRAVENOUS ONCE
Status: DISCONTINUED | OUTPATIENT
Start: 2025-01-22 | End: 2025-01-22

## 2025-01-22 RX ORDER — LABETALOL HYDROCHLORIDE 5 MG/ML
10 INJECTION, SOLUTION INTRAVENOUS EVERY 10 MIN PRN
Status: DISCONTINUED | OUTPATIENT
Start: 2025-01-22 | End: 2025-01-23

## 2025-01-22 RX ORDER — PROCHLORPERAZINE EDISYLATE 5 MG/ML
10 INJECTION INTRAMUSCULAR; INTRAVENOUS EVERY 6 HOURS PRN
Status: DISCONTINUED | OUTPATIENT
Start: 2025-01-22 | End: 2025-01-23

## 2025-01-22 RX ORDER — ONDANSETRON 2 MG/ML
4 INJECTION INTRAMUSCULAR; INTRAVENOUS EVERY 6 HOURS PRN
Status: DISCONTINUED | OUTPATIENT
Start: 2025-01-22 | End: 2025-01-23

## 2025-01-22 RX ORDER — SODIUM CHLORIDE 9 MG/ML
50 INJECTION, SOLUTION INTRAVENOUS ONCE
Status: COMPLETED | OUTPATIENT
Start: 2025-01-22 | End: 2025-01-22

## 2025-01-22 RX ORDER — ACETAMINOPHEN 650 MG/1
650 SUPPOSITORY RECTAL EVERY 6 HOURS PRN
Status: DISCONTINUED | OUTPATIENT
Start: 2025-01-22 | End: 2025-01-23

## 2025-01-22 RX ORDER — LEVETIRACETAM 500 MG/5ML
2 INJECTION, SOLUTION, CONCENTRATE INTRAVENOUS
Status: COMPLETED | OUTPATIENT
Start: 2025-01-22 | End: 2025-01-22

## 2025-01-22 RX ORDER — SODIUM CHLORIDE 0.9 % (FLUSH) 0.9 %
5-40 SYRINGE (ML) INJECTION EVERY 12 HOURS SCHEDULED
Status: DISCONTINUED | OUTPATIENT
Start: 2025-01-22 | End: 2025-01-23

## 2025-01-22 RX ORDER — SODIUM CHLORIDE 9 MG/ML
INJECTION, SOLUTION INTRAVENOUS PRN
Status: DISCONTINUED | OUTPATIENT
Start: 2025-01-22 | End: 2025-01-23

## 2025-01-22 RX ORDER — ACETAMINOPHEN 325 MG/1
650 TABLET ORAL EVERY 6 HOURS PRN
Status: DISCONTINUED | OUTPATIENT
Start: 2025-01-22 | End: 2025-01-23

## 2025-01-22 RX ORDER — IOPAMIDOL 755 MG/ML
100 INJECTION, SOLUTION INTRAVASCULAR
Status: DISCONTINUED | OUTPATIENT
Start: 2025-01-22 | End: 2025-01-23

## 2025-01-22 RX ORDER — SODIUM CHLORIDE 0.9 % (FLUSH) 0.9 %
5-40 SYRINGE (ML) INJECTION PRN
Status: DISCONTINUED | OUTPATIENT
Start: 2025-01-22 | End: 2025-01-23

## 2025-01-22 RX ORDER — IOPAMIDOL 755 MG/ML
100 INJECTION, SOLUTION INTRAVASCULAR
Status: COMPLETED | OUTPATIENT
Start: 2025-01-22 | End: 2025-01-22

## 2025-01-22 RX ORDER — ONDANSETRON 4 MG/1
4 TABLET, ORALLY DISINTEGRATING ORAL EVERY 8 HOURS PRN
Status: DISCONTINUED | OUTPATIENT
Start: 2025-01-22 | End: 2025-01-23

## 2025-01-22 RX ADMIN — PROCHLORPERAZINE EDISYLATE 10 MG: 5 INJECTION INTRAMUSCULAR; INTRAVENOUS at 19:13

## 2025-01-22 RX ADMIN — SODIUM CHLORIDE 50 ML: 9 INJECTION, SOLUTION INTRAVENOUS at 16:37

## 2025-01-22 RX ADMIN — LEVETIRACETAM 2000 MG: 100 INJECTION INTRAVENOUS at 16:33

## 2025-01-22 RX ADMIN — NICARDIPINE HYDROCHLORIDE 5 MG/HR: 0.1 INJECTION, SOLUTION INTRAVENOUS at 15:46

## 2025-01-22 RX ADMIN — ONDANSETRON 4 MG: 2 INJECTION INTRAMUSCULAR; INTRAVENOUS at 18:37

## 2025-01-22 RX ADMIN — SODIUM CHLORIDE, PRESERVATIVE FREE 10 ML: 5 INJECTION INTRAVENOUS at 20:47

## 2025-01-22 RX ADMIN — IOPAMIDOL 80 ML: 755 INJECTION, SOLUTION INTRAVENOUS at 16:00

## 2025-01-22 RX ADMIN — LABETALOL HYDROCHLORIDE 10 MG: 5 INJECTION INTRAVENOUS at 15:43

## 2025-01-22 RX ADMIN — PROTHROMBIN COMPLEX CONCENTRATE (HUMAN) 2160 UNITS: 25.5; 16.5; 24; 22; 22; 26 POWDER, FOR SOLUTION INTRAVENOUS at 16:25

## 2025-01-22 ASSESSMENT — LIFESTYLE VARIABLES: HOW OFTEN DO YOU HAVE A DRINK CONTAINING ALCOHOL: PATIENT UNABLE TO ANSWER

## 2025-01-22 NOTE — ED NOTES
Pharmacy to CT with patient.     15:43: 10mg Labetolol IV given at this time.     15:47: Nicardipine 5mg IV infusion began at this time.    /127    15:48: 10mg Labetolol IV given at this time.   /113.

## 2025-01-22 NOTE — ED NOTES
Patient's hearing aids and gold necklace at bedside in clear bag with patient label. Patient's shirt, pants, and jacket at bedside with patient label.

## 2025-01-22 NOTE — ED PROVIDER NOTES
[facility] EMERGENCY DEPARTMENT  EMERGENCY DEPARTMENT ENCOUNTER      Pt Name: Jessica Pimentel  MRN: [id number]  Birthdate 8/25/1932  Date of evaluation: 1/22/2025  Provider: Ellis Shook MD    CHIEF COMPLAINT       Chief Complaint   Patient presents with    Aphasia    Extremity Weakness         HISTORY OF PRESENT ILLNESS   (Location/Symptom, Timing/Onset, Context/Setting, Quality, Duration, Modifying Factors, Severity)  Note limiting factors.   Patient is a 89-year-old female presenting to the emergency department for concerns of stroke.  Patient's last well-known was approximately 2 PM when patient called 911 with complaint of right arm weakness.  EMS on arrival stated that patient was having aphasia altered mental status borderline unresponsive.  On arrival here patient is awake not oriented moving left arm and left leg spontaneously with left-sided gaze.  Patient has a history of atrial fibrillation is on Eliquis EMS also noted that patient was extremely hypertensive with initial blood pressure 215/114.  On arrival here patient's blood pressure 223/118 patient went emergently to CT for imaging as a Code S level 1.            Review of External Medical Records:     Nursing Notes were reviewed.    REVIEW OF SYSTEMS    (2-9 systems for level 4, 10 or more for level 5)     Review of Systems    Except as noted above the remainder of the review of systems was reviewed and negative.       PAST MEDICAL HISTORY   No past medical history on file.      SURGICAL HISTORY     No past surgical history on file.      CURRENT MEDICATIONS       Previous Medications    ASPIRIN 81 MG EC TABLET    Take 1 tablet by mouth daily    ATORVASTATIN (LIPITOR) 40 MG TABLET    Take 1 tablet by mouth daily    ELIQUIS 2.5 MG TABS TABLET    Take 1 tablet by mouth 2 times daily    METHOCARBAMOL (ROBAXIN) 500 MG TABLET    Take 1 tablet by mouth 4 times daily    VALSARTAN (DIOVAN) 40 MG TABLET    Take 1 tablet by mouth daily       ALLERGIES    occasionally words are mis-transcribed.)    Ellis Shook MD (electronically signed)  Emergency Attending Physician / Physician Assistant / Nurse Practitioner      Perfect Serve Consult for Admission  5:27 PM    ED Room Number: ER15/15  Patient Name and age:  Jessica Pimentel 89 y.o.  female  Working Diagnosis:   1. CVA (cerebrovascular accident due to intracerebral hemorrhage) (HCC)        COVID-19 Suspicion: No  Sepsis present:  No  Reassessment needed: Yes  Code Status:  Full Code  Readmission: No  Isolation Requirements: no  Recommended Level of Care: ICU  Department: [facility] Adult ED - [phone]  Consulting Provider: NSGY, Teleneurology, ICU    Other:       Total critical care time spent exclusive of procedures:  70 minutes.          Ellis Shook MD  01/22/25 5218

## 2025-01-22 NOTE — ACP (ADVANCE CARE PLANNING)
Discussed current situation with patient son/POA Delgado. Discussed current risk of deterioration given significant size of intracranial hemorrhage in setting of anticoagulation use and severe altered mental status. Delgado states that his mother would not want to be on life support or receive aggressive life saving measures including CPR and intubation. He wishes to make his mother DNR/DNI. Orders placed in chart to reflect this change. Opportunities provided for questions and all were answered.

## 2025-01-22 NOTE — PROGRESS NOTES
ICH Documentation Note     Brief HPI: 89 year old female who presents from home with aphasia, right sided weakness.     Medical hx Atrial fibrillation on eliquis       Traumatic ICH? NO   ICH Score ON   ARRIVAL:   3    Imagin25 CT Head   Large left frontoparietal parenchymal hemorrhage with small  associated subarachnoid hemorrhage in surrounding vasogenic edema and mass effect with 5 mm rightward midline shift       Plan: Neurosurgery Consult   SBP < 140        Taty Siu, APRN - CNP  Neurovascular Nurse Practitioner

## 2025-01-22 NOTE — ED TRIAGE NOTES
Patient arrives to the ED from home for complaints of aphasia, R sided weakness, R facial droop. Patient arrives to the ED with R sided weakness, L sided gaze.     Patient was at home with her  and caretaker when they said at 1445 today she told them she could not move her R arm and was short of breath. LKW 1445.    EMS denies any recent falls. Patient does take Eliquis. Patient is a full code. .

## 2025-01-22 NOTE — H&P
CRITICAL CARE ADMISSION NOTE      Name: Jessica Pimentel   : 1932   MRN: [id number]   Date: 2025      Reason for ICU Admission: IPH, SAH      HISTORY OF PRESENT ILLNESS:   Jessica Pimentel is a 89 F w/ hx of Afib on Eliquis, HTN, HLD presenting to the ED via EMS today for acute onset R sided weakness and aphasia onset today. Per report, patient was at home with her  and caregivers around 1445 this afternoon when she notified caregivers of inability to move her right arm. EMS was called for transport and code stroke called upon arrival to the ED. Dry CT head revealed large left frontoparietal intraparenchymal hemorrhage with small amount of SAH and 5mm rightward shift. Neurosurgery and ICU teams consulted for evaluation. Patient admitted to ICU for further evaluation and management.     At time of my interview patient is aphasic. Follows simple commands in the left upper extremity. Flaccid right side. Eyes open but with leftward gaze. History is otherwise limited.     Review of Systems   Reason unable to perform ROS: altered mental status.         Past Medical History:      has no past medical history on file.    Past Surgical History:      has no past surgical history on file.    Home Medications:     Prior to Admission medications    Medication Sig Start Date End Date Taking? Authorizing Provider   aspirin 81 MG EC tablet Take 1 tablet by mouth daily    Mary Christy MD   ELIQUIS 2.5 MG TABS tablet Take 1 tablet by mouth 2 times daily 23   Mary Christy MD   atorvastatin (LIPITOR) 40 MG tablet Take 1 tablet by mouth daily    Mary Christy MD   methocarbamol (ROBAXIN) 500 MG tablet Take 1 tablet by mouth 4 times daily    Mary Christy MD   valsartan (DIOVAN) 40 MG tablet Take 1 tablet by mouth daily    Mary Christy MD       Reviewed 25  Allergies/Social/Family History:     Allergies   Allergen Reactions    Codeine Other (See Comments)     Other

## 2025-01-22 NOTE — ED NOTES
TRANSFER - OUT REPORT:    Verbal report given to BRENNAN Hernandez on Jessica Pimentel  being transferred to ICU 7112 for routine progression of patient care       Report consisted of patient's Situation, Background, Assessment and   Recommendations(SBAR).     Information from the following report(s) Nurse Handoff Report, Index, ED Encounter Summary, ED SBAR, Adult Overview, Intake/Output, MAR, Recent Results, Cardiac Rhythm A-fib, and Neuro Assessment was reviewed with the receiving nurse.    [place] Fall Assessment:    Presents to emergency department  because of falls (Syncope, seizure, or loss of consciousness): No  Age > 70: Yes  Altered Mental Status, Intoxication with alcohol or substance confusion (Disorientation, impaired judgment, poor safety awaremess, or inability to follow instructions): Yes  Impaired Mobility: Ambulates or transfers with assistive devices or assistance; Unable to ambulate or transer.: Yes  Nursing Judgement: Yes          Lines:   Peripheral IV 01/22/25 Left Antecubital (Active)       Peripheral IV 01/22/25 Right Antecubital (Active)       Peripheral IV 01/22/25 Right Antecubital (Active)        Opportunity for questions and clarification was provided.      Patient transported with:  Monitor, O2 @ 2lpm, and Registered Nurse

## 2025-01-23 PROBLEM — R40.0 SOMNOLENCE: Status: ACTIVE | Noted: 2025-01-23

## 2025-01-23 PROBLEM — R11.10 VOMITING: Status: ACTIVE | Noted: 2025-01-23

## 2025-01-23 PROBLEM — Z51.5 PALLIATIVE CARE ENCOUNTER: Status: ACTIVE | Noted: 2025-01-23

## 2025-01-23 LAB
ANION GAP SERPL CALC-SCNC: 10 MMOL/L (ref 2–12)
BUN SERPL-MCNC: 17 MG/DL (ref 6–20)
BUN/CREAT SERPL: 26 (ref 12–20)
CALCIUM SERPL-MCNC: 9.1 MG/DL (ref 8.5–10.1)
CHLORIDE SERPL-SCNC: 101 MMOL/L (ref 97–108)
CHOLEST SERPL-MCNC: 190 MG/DL
CO2 SERPL-SCNC: 23 MMOL/L (ref 21–32)
CREAT SERPL-MCNC: 0.65 MG/DL (ref 0.55–1.02)
EKG DIAGNOSIS: NORMAL
EKG Q-T INTERVAL: 366 MS
EKG QRS DURATION: 86 MS
EKG QTC CALCULATION (BAZETT): 442 MS
EKG R AXIS: 79 DEGREES
EKG T AXIS: 22 DEGREES
EKG VENTRICULAR RATE: 88 BPM
ERYTHROCYTE [DISTWIDTH] IN BLOOD BY AUTOMATED COUNT: 13.3 % (ref 11.5–14.5)
EST. AVERAGE GLUCOSE BLD GHB EST-MCNC: 117 MG/DL
GLUCOSE BLD STRIP.AUTO-MCNC: 129 MG/DL (ref 65–117)
GLUCOSE SERPL-MCNC: 133 MG/DL (ref 65–100)
HBA1C MFR BLD: 5.7 % (ref 4–5.6)
HCT VFR BLD AUTO: 42.1 % (ref 35–47)
HDLC SERPL-MCNC: 67 MG/DL
HDLC SERPL: 2.8 (ref 0–5)
HGB BLD-MCNC: 14 G/DL (ref 11.5–16)
LDLC SERPL CALC-MCNC: 107.2 MG/DL (ref 0–100)
MAGNESIUM SERPL-MCNC: 2.2 MG/DL (ref 1.6–2.4)
MCH RBC QN AUTO: 30.2 PG (ref 26–34)
MCHC RBC AUTO-ENTMCNC: 33.3 G/DL (ref 30–36.5)
MCV RBC AUTO: 90.9 FL (ref 80–99)
NRBC # BLD: 0 K/UL (ref 0–0.01)
NRBC BLD-RTO: 0 PER 100 WBC
PHOSPHATE SERPL-MCNC: 3.4 MG/DL (ref 2.6–4.7)
PLATELET # BLD AUTO: 264 K/UL (ref 150–400)
PMV BLD AUTO: 10.4 FL (ref 8.9–12.9)
POTASSIUM SERPL-SCNC: 3.5 MMOL/L (ref 3.5–5.1)
RBC # BLD AUTO: 4.63 M/UL (ref 3.8–5.2)
SERVICE CMNT-IMP: ABNORMAL
SODIUM SERPL-SCNC: 134 MMOL/L (ref 136–145)
TRIGL SERPL-MCNC: 79 MG/DL
VLDLC SERPL CALC-MCNC: 15.8 MG/DL
WBC # BLD AUTO: 12.3 K/UL (ref 3.6–11)

## 2025-01-23 PROCEDURE — 80061 LIPID PANEL: CPT

## 2025-01-23 PROCEDURE — 85027 COMPLETE CBC AUTOMATED: CPT

## 2025-01-23 PROCEDURE — 93010 ELECTROCARDIOGRAM REPORT: CPT | Performed by: SPECIALIST

## 2025-01-23 PROCEDURE — APPNB60 APP NON BILLABLE TIME 46-60 MINS

## 2025-01-23 PROCEDURE — 99223 1ST HOSP IP/OBS HIGH 75: CPT | Performed by: STUDENT IN AN ORGANIZED HEALTH CARE EDUCATION/TRAINING PROGRAM

## 2025-01-23 PROCEDURE — 83735 ASSAY OF MAGNESIUM: CPT

## 2025-01-23 PROCEDURE — 80048 BASIC METABOLIC PNL TOTAL CA: CPT

## 2025-01-23 PROCEDURE — 6360000002 HC RX W HCPCS: Performed by: STUDENT IN AN ORGANIZED HEALTH CARE EDUCATION/TRAINING PROGRAM

## 2025-01-23 PROCEDURE — 83036 HEMOGLOBIN GLYCOSYLATED A1C: CPT

## 2025-01-23 PROCEDURE — 84100 ASSAY OF PHOSPHORUS: CPT

## 2025-01-23 PROCEDURE — 2500000003 HC RX 250 WO HCPCS

## 2025-01-23 PROCEDURE — 6360000002 HC RX W HCPCS: Performed by: FAMILY MEDICINE

## 2025-01-23 PROCEDURE — 99223 1ST HOSP IP/OBS HIGH 75: CPT | Performed by: PHYSICAL MEDICINE & REHABILITATION

## 2025-01-23 PROCEDURE — 99223 1ST HOSP IP/OBS HIGH 75: CPT | Performed by: INTERNAL MEDICINE

## 2025-01-23 PROCEDURE — 1100000000 HC RM PRIVATE

## 2025-01-23 PROCEDURE — 36415 COLL VENOUS BLD VENIPUNCTURE: CPT

## 2025-01-23 PROCEDURE — 82962 GLUCOSE BLOOD TEST: CPT

## 2025-01-23 PROCEDURE — 2580000003 HC RX 258: Performed by: STUDENT IN AN ORGANIZED HEALTH CARE EDUCATION/TRAINING PROGRAM

## 2025-01-23 RX ORDER — POLYETHYLENE GLYCOL 3350 17 G/17G
17 POWDER, FOR SOLUTION ORAL DAILY PRN
Status: DISCONTINUED | OUTPATIENT
Start: 2025-01-23 | End: 2025-01-25 | Stop reason: HOSPADM

## 2025-01-23 RX ORDER — LORAZEPAM 2 MG/ML
1 INJECTION INTRAMUSCULAR EVERY 6 HOURS PRN
Status: DISCONTINUED | OUTPATIENT
Start: 2025-01-23 | End: 2025-01-25 | Stop reason: HOSPADM

## 2025-01-23 RX ORDER — HYDRALAZINE HYDROCHLORIDE 20 MG/ML
10 INJECTION INTRAMUSCULAR; INTRAVENOUS EVERY 6 HOURS PRN
Status: DISCONTINUED | OUTPATIENT
Start: 2025-01-23 | End: 2025-01-25 | Stop reason: HOSPADM

## 2025-01-23 RX ORDER — ONDANSETRON 2 MG/ML
4 INJECTION INTRAMUSCULAR; INTRAVENOUS EVERY 6 HOURS PRN
Status: DISCONTINUED | OUTPATIENT
Start: 2025-01-23 | End: 2025-01-25 | Stop reason: HOSPADM

## 2025-01-23 RX ORDER — GLYCOPYRROLATE 0.2 MG/ML
0.2 INJECTION INTRAMUSCULAR; INTRAVENOUS EVERY 4 HOURS PRN
Status: DISCONTINUED | OUTPATIENT
Start: 2025-01-23 | End: 2025-01-25 | Stop reason: HOSPADM

## 2025-01-23 RX ADMIN — PANTOPRAZOLE SODIUM 40 MG: 40 INJECTION, POWDER, FOR SOLUTION INTRAVENOUS at 11:20

## 2025-01-23 RX ADMIN — HYDRALAZINE HYDROCHLORIDE 10 MG: 20 INJECTION INTRAMUSCULAR; INTRAVENOUS at 17:29

## 2025-01-23 RX ADMIN — SODIUM CHLORIDE, PRESERVATIVE FREE 10 ML: 5 INJECTION INTRAVENOUS at 08:36

## 2025-01-23 NOTE — PLAN OF CARE
Problem: Safety - Adult  Goal: Free from fall injury  Outcome: Progressing  Flowsheets (Taken 1/22/2025 2000)  Free From Fall Injury: Instruct family/caregiver on patient safety     Problem: Discharge Planning  Goal: Discharge to home or other facility with appropriate resources  Outcome: Progressing  Flowsheets (Taken 1/22/2025 2000)  Discharge to home or other facility with appropriate resources: Identify barriers to discharge with patient and caregiver     Problem: Pain  Goal: Verbalizes/displays adequate comfort level or baseline comfort level  Outcome: Progressing  Flowsheets (Taken 1/22/2025 2000)  Verbalizes/displays adequate comfort level or baseline comfort level: Assess pain using appropriate pain scale

## 2025-01-23 NOTE — PROGRESS NOTES
CRITICAL CARE PROGRESS NOTE    Name: Jessica Pimentel   : 1932   MRN: [id number]   Date: 2025      ICU Diagnosis    Acute IPH  Acute SAH  Altered Mental Status    Overnight Events   Admitted - please see HPI.     ROS negative except as otherwise documented.     A/P   This is a 89 year old female with history of HTN, AFIB (eliquis) and HLD who presented to the ED with RHB weakness - admitted to the ICU after CTH revealed large left frontoparietal IPH with small SAH and 5 mm rightward shift.     NEUROLOGICAL:    Large frontoparietal IPH with SAH and midline shift. On AC outpatient - post reversal.   - NSU consulted  - neurology consulted  - SBP < 160  - Q2 neuro checks  - interval CT in N/V improves  - PT/OT/SLP  - hold AC/AP  - GOC    PULMONOLOGY:   2L NC. Likely secondary to mentation. CXR not acute.   - goal SpO2>=92, pH>=7.30  - O2 PRN; wean as tolerated    CARDIOVASCULAR:   History of FIB. Not in shock. No TTE for review.   - MAP > 65  - consider TTE  - hold home eliquis  - hold home BP meds     RENAL/ELECTROLYTE:   Renal function baseline.   - goal K>=4, Mg>=2, Phos >3  - daily BMP  - strict I/O's; daily weights  - avoid nephrotoxic medications    ENDOCRINE:   - SSI    ID/MICRO:   No signs or symptoms of active infection  - monitor off ABX    ICU DAILY CHECKLIST     Code Status: DNR  DVT Prophylaxis: SCDs  T/L/D: PIV  SUP: N/A  Diet: NPO  ABCDEF Bundle/Checklist Completed:Yes  Disposition: Stay in ICU  Multidisciplinary Rounds Completed:  Yes  Patient/Family Updated: Yes    OBJECTIVE:     Blood pressure (!) 148/77, pulse 96, temperature 98.6 °F (37 °C), temperature source Axillary, resp. rate 17, height 1.524 m (5'), weight 58.7 kg (129 lb 6.6 oz), SpO2 98%.  Physical Exam  Gen: NAD  HEENT: NC/AT, supple  Cardiac: RRR, no M/R/G  Pulm: CTA bilaterally  ABD: S/NT/ND, normal bowel sounds  Extremities: no edema  Skin: no rash  Neuro: somnolent    I have personally reviewed and interpreted all relevant  imaging and laboratory data.     CRITICAL CARE DOCUMENTATION  I had a face to face encounter with the patient, reviewed and interpreted patient data including clinical events, labs, images, vital signs, I/O's, and examined patient.  I have discussed the case and the plan and management of the patient's care with the consulting services, the bedside nurses and the respiratory therapist.      NOTE OF PERSONAL INVOLVEMENT IN CARE   This patient has a high probability of imminent, clinically significant deterioration, which requires the highest level of preparedness to intervene urgently. I participated in the decision-making and personally managed or directed the management of the following life and organ supporting interventions that required my frequent assessment to treat or prevent imminent deterioration.    I personally spent 30 minutes of critical care time.  This is time spent at this critically ill patient's bedside actively involved in patient care as well as the coordination of care.  This does not include any procedural time which has been billed separately.    Zeyad Henning M.D.  [facility]  1/23/2025

## 2025-01-23 NOTE — CONSULTS
Neurosurgery    Pt seen and examined, full consult to follow.     Aphasia, right side HP.    Left frontal intraparenchymal hemorrhage    On eliquis    Discussed with family in room,  Would not undergo large surgery at her age and neurologic status  They agree    Continue comfort measures

## 2025-01-23 NOTE — CONSULTS
Palliative Medicine  Patient Name: Jessica Pimentel  YOB: 1932  MRN: [id number]  Age: 89 y.o.  Gender: female    Date of Initial Consult: 25  Date of Service: 2025  Time: 3:24 PM  Provider: Glenna Catherine MD  Hospital Day: 2  Admit Date: 2025  Referring Provider: Dr Henning       Reasons for Consultation:  Goals of Care    HISTORY OF PRESENT ILLNESS (HPI):   Jessica Pimentel is a 89 y.o. female with a past medical history of a fib on Eliquis, HTN, HLD who was admitted on 2025 from home via EMS after having sudden R sided weakness and aphasia. SBP>200. Head CT showing large L frontoparietal parenchymal  hemorrhage w/ 5mm midline shift. Nsgy not recommending surgery. Minimally interactive, nauseated/ multiple episodes of vomiting.     Decision by family to move to comfort measures only.     Psychosocial: Pt is , her  has dementia (still recognizes family, but needs a lot of care- has [facility] [place]).   They had 2 children, one . Living is son Kurt \"Delgado\". She was Cameron Pimentel' aunt.   Pt drove to lunch the day of her hemorrhage. She did ADLs/IADLs. She loved to shop, socialize.    PALLIATIVE DIAGNOSES:    Minimally responsive   Vomiting   Goals of care/palliative care encounter     ASSESSMENT AND PLAN:   Events and discussions w/ Dr Henning and ICU team noted.   Son/decision maker Delgado has decided upon comfort measures only. Meet w/ him at bedside- he has a list of Hospice agencies from care management.   Recent baseline:   Pt very functional up until the day of her hemorrhage. Drove almost daily to do some type of shopping, enjoyed conversation with others. One of her favorite stores is [employer].   She helped care for her  who has ~ moderate dementia. They also have [facility] 7 days a week.   Current condition:   Quality of life very important to pt- and family clear that pt would want comfort only/Hospice.   Plan of care:   We discuss Hospice philosophy-

## 2025-01-23 NOTE — PROGRESS NOTES
Shift Summary      1000: MD Torrey at bedside, advanced neuro checks to q2hr. Plan for family discussion regarding goals of care.     1106: [facility] called. Pt not accepted d/t age factor. Instructed to call when/if patient passes.     1200: Pt transferred to O. Palliative consulted.

## 2025-01-23 NOTE — PROGRESS NOTES
Physical Therapy  01/23/2025    Order acknowledged, chart reviewed, discussed with RN and OT. Patient currently with poor responsiveness, no functional command following, non-purposeful movement of the L side only with response to noxious stimuli, and grave prognosis s/p admission for L frontoparietal ICH with SAH and R midline shift.      Noted plans for GOC discussion with family later today, not appropriate for skilled PT evaluation. Will follow up pending GOC and appropriateness therapy intervention.     Thank you,  Britney Li, PT, DPT

## 2025-01-23 NOTE — PROGRESS NOTES
Shift Summary:    Patient non interactive and non verbal. Does not follow command and responds only to pain stimuli.      Through the shift, patient had multiple episodes of bilious vomiting that increased mainly with motion and laying flat.    This am, patient was laid flat to check tolerance of CT. Patient immediately started heaving and vomiting. [place]ila NP was notified and CT order was cancelled.

## 2025-01-23 NOTE — CARE COORDINATION
Care Management Initial Assessment       RUR:  11%  Readmission? No  1st IM letter given? No  1st  letter given: No  Patient has Medicare and [place] BCBS    Admission  CVA  intraparenchymal hemorrhage of brain  Hx of a fib HTN HLD     No responsive  comfort measures   Palliative care consulted    Disposition  Hospice Care  Home with Hospice and assistance through [facility]   Facility with admission to Hospice    Cm met with son, Kurt Garcia) in patient's room   He confirmed that patient will be comfort care.  Son met with Palliative Care.   Patient does not meet GIP.   Hospice list provided to son and he will review it and decide choice of agency-- and  if patient will discharge home with hospice,or to a  facility with admission to hospice or hospice house.   CM received consult for Hospice  CM will send referral when hospice choice is made (order is faxed attached in [facility])    Son is concerned about his father's, (patient's 's,)  reaction to patient returning home in her current state as she was alert/ oriented and driving yesterday 1/22/25--  has dementia and patient is  his caregiver in the evening after the caregiver from [facility] leaves .      Transportation  BLS    DME  patient has no dme   if patient discharges home the hospice agency will arrange dme/    Caretakers---patient's , who has dementia,  is serviced by [employer] [place] 7 days a week-- 8 hours a day to assist with basic needs.  The caretakers assist with meals cooking and housekeeping.. If patient is discharged home, son will likely increase hours to meet patient's needs.     Contact  Micah Pimentel  [phone]     CM met with son in patient's room-- patient  is not responsive.. Son states patient was active, driving and requiring no assistance or dme prior to admission.. Patient drove herself and  to appointments and assisted with his care after caretakers from [facility]

## 2025-01-23 NOTE — PLAN OF CARE
Problem: Pain  Goal: Verbalizes/displays adequate comfort level or baseline comfort level  Outcome: Not Progressing     Problem: Safety - Adult  Goal: Free from fall injury  Outcome: Progressing  Flowsheets (Taken 1/23/2025 1650)  Free From Fall Injury:   Instruct family/caregiver on patient safety   Based on caregiver fall risk screen, instruct family/caregiver to ask for assistance with transferring infant if caregiver noted to have fall risk factors     Problem: Discharge Planning  Goal: Discharge to home or other facility with appropriate resources  Outcome: Progressing  Flowsheets (Taken 1/23/2025 1650)  Discharge to home or other facility with appropriate resources:   Identify barriers to discharge with patient and caregiver   Arrange for needed discharge resources and transportation as appropriate   Identify discharge learning needs (meds, wound care, etc)   Arrange for interpreters to assist at discharge as needed   Refer to discharge planning if patient needs post-hospital services based on physician order or complex needs related to functional status, cognitive ability or social support system     Problem: Skin/Tissue Integrity  Goal: Absence of new skin breakdown  Description: 1.  Monitor for areas of redness and/or skin breakdown  2.  Assess vascular access sites hourly  3.  Every 4-6 hours minimum:  Change oxygen saturation probe site  4.  Every 4-6 hours:  If on nasal continuous positive airway pressure, respiratory therapy assess nares and determine need for appliance change or resting period.  Outcome: Progressing     Problem: Chronic Conditions and Co-morbidities  Goal: Patient's chronic conditions and co-morbidity symptoms are monitored and maintained or improved  Outcome: Progressing  Flowsheets (Taken 1/23/2025 1650)  Care Plan - Patient's Chronic Conditions and Co-Morbidity Symptoms are Monitored and Maintained or Improved:   Monitor and assess patient's chronic conditions and comorbid

## 2025-01-23 NOTE — PROGRESS NOTES
Shift Summary    1825: Pt admitted to ICU by ED Rns. Dual neuro assessment performed. NIH 26. See flowsheet for additional assessment notations. Dual skin assessment performed with BRENNAN Montgomery. Pt continues to vomit numerously. Given Zofran and Compazine with no improvement.     1848: Code status now DNR/DNI

## 2025-01-23 NOTE — PROGRESS NOTES
4 Eyes Skin Assessment     NAME:  Jessica Pimentel  YOB: 1932  MEDICAL RECORD NUMBER:  [id number]    The patient is being assessed for  Admission    I agree that at least one RN has performed a thorough Head to Toe Skin Assessment on the patient. ALL assessment sites listed below have been assessed.      Areas assessed by both nurses:    Head, Face, Ears, Shoulders, Back, Chest, Arms, Elbows, Hands, Sacrum. Buttock, Coccyx, Ischium, Legs. Feet and Heels, and Under Medical Devices         Does the Patient have a Wound? No noted wound(s)       Jace Prevention initiated by RN: Yes  Wound Care Orders initiated by RN: No    Pressure Injury (Stage 3,4, Unstageable, DTI, NWPT, and Complex wounds) if present, place Wound referral order by RN under : No    New Ostomies, if present place, Ostomy referral order under : No     Nurse 1 eSignature: Electronically signed by Mary Mackey RN on 1/22/25 at 8:20 PM EST    **SHARE this note so that the co-signing nurse can place an eSignature**    Nurse 2 eSignature: Electronically signed by Joseph Solis RN on 1/22/25 at 10:23 PM EST

## 2025-01-23 NOTE — PROGRESS NOTES
SLP Contact Note    Consult received and appreciated. Chart reviewed. Patient non-responsive at this time and not appropriate to participate in SLP evaluation. GOC conversation with family pending. SLP will follow for evaluation as patient able/appropriate.     Thank you,  Chanelle Mosqueda M.Ed, CCC-SLP (pronouns: she/her/hers)  Speech-Language Pathologist    Hospital Course  Presents for acute R sided weakness and aphasia  Swallow Screen: Fail    Prandial Aspiration Risk Factors: acute stroke, acute neurologic changes, and advanced age at risk for presbyphagia  Silent Aspiration Risk Factors:  none apparent on chart review  Post-Prandial Aspiration Risk Factors:  multiple episodes of emsis last night    Pertinent Medications:     Pertinent Imaging and Labs:  CT Head:  Large left frontoparietal parenchymal hemorrhage with small  associated subarachnoid hemorrhage in surrounding vasogenic edema and mass effect with 5 mm rightward midline shift   XR Chest: no acute disease    WBC: 12.3   Other important labs or tests:   Urine pH: 8.5 (elevated)    Previously seen by SLP:  no  Reason for consult: stroke    Stroke:  Admitting NIHSS score: 26  Current NIHSS score: 24  TICI Score:   ICH Score: 3

## 2025-01-23 NOTE — CONSULTS
[facility] CARDIOLOGY  Cardiology Care Note                  [x]Initial visit     []Established visit     Patient Name: Jessica Pimentel - :1932 - MRN:[id number]  Primary Cardiologist: None  Consulting Cardiologist: Manny Cardona MD           Assessment/Plan/Discussion: Cardiology Attending:     ASSESSMENT  1.  Intraparenchymal hemorrhage  2.  History of atrial fibrillation on oral anticoagulation  3.  Hypertension  4.  Hyperlipidemia      PLAN Jessica Pimentel was evaluated for above-mentioned problems.  She is relatively frail 82-year-old female who.  To the hospital with intracranial hemorrhage which was probably contributed by hypertension as well as being on oral anticoagulation.  Long-term she will not be a good candidate for oral anticoagulation.  Recommend discontinuation.  While left appendage occlusion can be considered as an alternative, overall patient appears frail and overall likelihood of survival does not seem to be 1 year or higher.  Hence on the fence about relative benefit of watchman at this time.  Family to have goals of care discussion and if they decide to keep the patient comfortable, that would be reasonable.    Addendum: Palliative care team had discussion with the family.  They want comfort care measures only.  No further intervention from a cardiac standpoint.          Manny Cardona MD    [facility] [place]  [facility]               Reason for initial visit:  AF, contraindication for OAC    HPI:    Ms. Pimentel is a 89 yo female who presented to [facility] ER via EMS for acute R sided weakness and aphasia.   Found to have large left frontoparietal intraparenchymal hemorrhage with small amount of SAH and 5 mm rightward shift.      PMH include AF on eliquis, HTN, HLD.        Assessment and Plan     Persistent recurrent atrial fibrillation:  Previously on eliquis, now with  14.0   HCT 41.3 42.1    264     Creatinine (MG/DL)   Date Value   01/23/2025 0.65   01/22/2025 0.76       Current meds:    Current Facility-Administered Medications:     pantoprazole (PROTONIX) 40 mg in sodium chloride (PF) 0.9 % 10 mL injection, 40 mg, IntraVENous, Daily, Zeyad Henning MD    iopamidol (ISOVUE-370) 76 % injection 100 mL, 100 mL, IntraVENous, ONCE PRN, Ignacio Lancaster MD    niCARdipine (CARDENE) 20 mg in 0.86 % sodium chloride 200 mL infusion, 2.5-15 mg/hr, IntraVENous, Continuous, Ellis Shook MD, Stopped at 01/22/25 1641    labetalol (NORMODYNE;TRANDATE) injection 10 mg, 10 mg, IntraVENous, Q10 Min PRN, Ellis Shook MD, 10 mg at 01/22/25 1543    sodium chloride flush 0.9 % injection 5-40 mL, 5-40 mL, IntraVENous, 2 times per day, Miko Pedroza PA-C, 10 mL at 01/23/25 0836    sodium chloride flush 0.9 % injection 5-40 mL, 5-40 mL, IntraVENous, PRN, Miko Pedroza PA-C    0.9 % sodium chloride infusion, , IntraVENous, PRN, Miko Pedroza PA-C    acetaminophen (TYLENOL) tablet 650 mg, 650 mg, Oral, Q6H PRN **OR** acetaminophen (TYLENOL) suppository 650 mg, 650 mg, Rectal, Q6H PRN, Miko Pedroza PA-C    ondansetron (ZOFRAN-ODT) disintegrating tablet 4 mg, 4 mg, Oral, Q8H PRN **OR** ondansetron (ZOFRAN) injection 4 mg, 4 mg, IntraVENous, Q6H PRN, Miko Pedroza PA-C, 4 mg at 01/22/25 1837    prochlorperazine (COMPAZINE) injection 10 mg, 10 mg, IntraVENous, Q6H PRN, Miko Pedroza PA-C, 10 mg at 01/22/25 1913    Britt Umanzor, APRRENEA - CNP    [facility] Cardiology  Call center: (P) [phone]  (F) [phone]

## 2025-01-23 NOTE — PROGRESS NOTES
Patient with multiple emesis events overnight.  When laying flat, she again had episode of emesis.  Do not feel safe to transport to CT and lay flat for repeat CT without secured airway.  Patient is DNR/DNI.  Will cancel morning CT head.  GOC discussion this am when family returns.    Avila Melchor [facility] Critical Care

## 2025-01-23 NOTE — CONSULTS
10 mg IntraVENous Q10 Min PRN    sodium chloride flush 0.9 % injection 5-40 mL  5-40 mL IntraVENous 2 times per day    sodium chloride flush 0.9 % injection 5-40 mL  5-40 mL IntraVENous PRN    0.9 % sodium chloride infusion   IntraVENous PRN    acetaminophen (TYLENOL) tablet 650 mg  650 mg Oral Q6H PRN    Or    acetaminophen (TYLENOL) suppository 650 mg  650 mg Rectal Q6H PRN    ondansetron (ZOFRAN-ODT) disintegrating tablet 4 mg  4 mg Oral Q8H PRN    Or    ondansetron (ZOFRAN) injection 4 mg  4 mg IntraVENous Q6H PRN    prochlorperazine (COMPAZINE) injection 10 mg  10 mg IntraVENous Q6H PRN     I personally reviewed all of the medications    Review of Systems:   Review of systems not obtained due to patient factors. Patient is altered and too drowsy therefore too cognitively or communication impaired to participate in ROS.    Physical Exam:   Blood pressure (!) 86/48, pulse 92, temperature 98.7 °F (37.1 °C), temperature source Axillary, resp. rate 15, height 1.524 m (5'), weight 56.5 kg (124 lb 9 oz), SpO2 98%.    GEN: Calm, ill-appearing elderly female patient in NAD  HEENT: Normocephalic. Non-icteric, no congestion  Lungs: Coarse bilaterally Ant; non-labored breathing  Cardiac: S1,S2, irreg irreg rate and rhythm   Abdomen: No distention, soft, non-tender, pt nauseated- vomiting at time of exam  Extremities: 2+ Radial pulses, no clubbing, cyanosis, or edema  Skin: No rashes or lesions noted    NEURO:  Mental status: Patient is drowsy, RADHA orientation questions, no eyes opening to pain or voice.   Cranial Nerves: RADHA speech/language due to patient's status. PERRL, 3mm, midline, no nystagmus, right gaze preference.  No obvious asymmetry. RADHA  hearing fully d/t pts mentation (wears hearing aids at baseline)   Motor:  Normal bulk and tone, LUE and LLE moves spontaneously. RUE withdraws to pain. RLE with spontaneous movements.   Coordination:  Deferred due to patient mentation  Reflexes:  +2 throughout, up going toes

## 2025-01-23 NOTE — PROGRESS NOTES
Occupational Therapy  01/23/25    Order acknowledged, chart reviewed, discussed with RN. Patient currently with poor responsiveness, no functional command following, non-purposeful movement of the L side only with response to noxious stimuli, and grave prognosis s/p admission for L frontoparietal ICH with SAH and R midline shift.     Noted plans for GOC discussion with family later today, not appropriate for skilled OT evaluation. Will follow up pending GOC and appropriateness therapy intervention.     Thank you,   Chanelle Rockwell, OTD, OTR/L

## 2025-01-23 NOTE — PLAN OF CARE
Brief GOC Note    Spoke to the family. They request transition to CMO. All questions answered.     Zeyad Henning M.D.  Pulmonary and Critical Care Medicine   01/23/25

## 2025-01-23 NOTE — H&P
History and Physical    Date of Service:  1/23/2025  Primary Care Provider: Suzy Lobo MD  Source of information: The patient and Chart review    Chief Complaint: Aphasia and Extremity Weakness      History of Presenting Illness:   Jessica Pimentel is a 89 y.o. female who presents to the emergency room yesterday with right-sided weakness and aphasia subsequent workup in the ER with CT of the head revealed large left frontoparietal intraparenchymal hemorrhage with small amount of subarachnoid hemorrhage and 5 mm rightward shift.  Patient was subsequently admitted to ICU.  Since then patient has been evaluated by neurosurgery.  Neurology does not recommend any intervention given her age and neurology status.  Patient also was evaluated by neurology and no further workup was recommended.  Patient with known history of atrial fibrillation and was on Eliquis at home but now discontinued due to large intracranial hemorrhage.  Since that time family has decided to move forward with comfort care.  Subsequently palliative care was consulted and patient.  To transition out of ICU to assume care under the hospitalist service.    Review of systems cannot be obtained due to patient's mental status.     REVIEW OF SYSTEMS:  A comprehensive review of systems was negative except for that written in the History of Present Illness.     No past medical history on file.   No past surgical history on file.  Prior to Admission medications    Medication Sig Start Date End Date Taking? Authorizing Provider   aspirin 81 MG EC tablet Take 1 tablet by mouth daily    Mary Christy MD   ELIQUIS 2.5 MG TABS tablet Take 1 tablet by mouth 2 times daily 12/18/23   Mary Christy MD   atorvastatin (LIPITOR) 40 MG tablet Take 1 tablet by mouth daily    Mary Christy MD   methocarbamol (ROBAXIN) 500 MG tablet Take 1 tablet by mouth 4 times daily    Mary Christy MD   valsartan (DIOVAN) 40 MG tablet

## 2025-01-24 PROCEDURE — 1200000000 HC SEMI PRIVATE

## 2025-01-24 PROCEDURE — 6360000002 HC RX W HCPCS: Performed by: STUDENT IN AN ORGANIZED HEALTH CARE EDUCATION/TRAINING PROGRAM

## 2025-01-24 RX ORDER — GLYCOPYRROLATE 0.2 MG/ML
0.2 INJECTION INTRAMUSCULAR; INTRAVENOUS EVERY 4 HOURS PRN
Qty: 1 ML | Refills: 0 | Status: SHIPPED | OUTPATIENT
Start: 2025-01-24

## 2025-01-24 RX ORDER — POLYETHYLENE GLYCOL 3350 17 G/17G
17 POWDER, FOR SOLUTION ORAL DAILY PRN
Qty: 7 PACKET | Refills: 0 | Status: SHIPPED | OUTPATIENT
Start: 2025-01-24 | End: 2025-01-31

## 2025-01-24 RX ORDER — LORAZEPAM 2 MG/ML
1 INJECTION INTRAMUSCULAR EVERY 6 HOURS PRN
Qty: 1 ML | Refills: 0 | Status: SHIPPED | OUTPATIENT
Start: 2025-01-24 | End: 2025-01-31

## 2025-01-24 RX ORDER — HYDRALAZINE HYDROCHLORIDE 20 MG/ML
10 INJECTION INTRAMUSCULAR; INTRAVENOUS EVERY 6 HOURS PRN
Qty: 1 ML | Refills: 0 | Status: SHIPPED | OUTPATIENT
Start: 2025-01-24

## 2025-01-24 RX ADMIN — LORAZEPAM 1 MG: 2 INJECTION INTRAMUSCULAR; INTRAVENOUS at 04:04

## 2025-01-24 RX ADMIN — GLYCOPYRROLATE 0.2 MG: 0.2 INJECTION INTRAMUSCULAR; INTRAVENOUS at 22:44

## 2025-01-24 NOTE — PROGRESS NOTES
Physician Progress Note      PATIENT:               GERMAINE DOUGLAS  CSN #:                  [id number]  :                       1932  ADMIT DATE:       2025 3:41 PM  DISCH DATE:  RESPONDING  PROVIDER #:        Manoj Espinoza MD          QUERY TEXT:    Patient admitted with Intracerebral Hemorrhage and is on chronic   anticoagulation.   If possible, please document in the progress notes and   discharge summary if you are evaluating and/or treating any of the following:    The medical record reflects the following:  Risk Factors: 89 y.o. female with Afib on Eliquis, HTN, HLD  Clinical Indicators:   H/P  \"Acute IPH/SAH  While on Eliquis for Afib\"    \"- Status post Balfaxar for Eliquis reversal in the ED\"     CTH IMPRESSION:  Large left frontoparietal parenchymal hemorrhage with small associated   subarachnoid hemorrhage in surrounding vasogenic edema and mass effect with 5   mm rightward midline shift    Treatment: ICU and NSTU level of care, Balfaxar, Neurosurgery consult,   Neurology consult, Neuro checks and VS per unit routine, Brain imaging,   PT/OT/SLP    Thank you,  Roxi Gregg, BSN, RN, CCRN, CRCR  Options provided:  -- Intracerebral Hemorrhage associated with Eliquis  -- Bleeding unrelated to anticoagulation  -- Other - I will add my own diagnosis  -- Disagree - Not applicable / Not valid  -- Disagree - Clinically unable to determine / Unknown  -- Refer to Clinical Documentation Reviewer    PROVIDER RESPONSE TEXT:    This patient has Intracerebral Hemorrhage associated with Eliquis.    Query created by: Roxi Gregg on 2025 5:02 PM      QUERY TEXT:    Pt admitted with Intracranial hemorrhage.  Pt noted to have both surrounding   vasogenic pattern white matter edema and midline shift of 5 mm per CTH on   . If clinically significant, please document in progress notes and   discharge summary if you are evaluating/treating any of the following:    The medical record reflects the  following:  Risk Factors: 89 y.o. female with Afib on Eliquis, HTN, HLD  Clinical Indicators:  1/22 CTH IMPRESSION:  Large left frontoparietal parenchymal hemorrhage with small associated   subarachnoid hemorrhage in surrounding vasogenic edema and mass effect with 5   mm rightward midline shift  Treatment: ICU and NSTU level of care, Neurosurgery consult, Neurology   consult, Neuro checks and VS per unit routine, Brain imaging, PT/OT/SLP  Thank you,  Roxi Gregg, BSN, RN, CCRN, CRCR  Options provided:  -- Cerebral edema and Brain compression  -- Cerebral edema  -- Brain compression  -- Other - I will add my own diagnosis  -- Disagree - Not applicable / Not valid  -- Disagree - Clinically unable to determine / Unknown  -- Refer to Clinical Documentation Reviewer    PROVIDER RESPONSE TEXT:    This patient has cerebral edema and brain compression.    Query created by: Roxi Gregg on 1/23/2025 5:02 PM      Electronically signed by:  Manoj Espinoza MD 1/24/2025 10:18 AM

## 2025-01-24 NOTE — PROGRESS NOTES
[facility] Adult  Hospitalist Group                                                                                          Hospitalist Progress Note  Carolina Price PA-C  Answering service: [phone] OR [phone] from in house phone        Date of Service:  2025  NAME:  Jessica Pimentel  :  1932  MRN:  [id number]       Admission Summary:   Jessica Pimentel is a 89 y.o. female who presents to the emergency room yesterday with right-sided weakness and aphasia subsequent workup in the ER with CT of the head revealed large left frontoparietal intraparenchymal hemorrhage with small amount of subarachnoid hemorrhage and 5 mm rightward shift.  Patient was subsequently admitted to ICU.  Since then patient has been evaluated by neurosurgery.  Neurology does not recommend any intervention given her age and neurology status.  Patient also was evaluated by neurology and no further workup was recommended.  Patient with known history of atrial fibrillation and was on Eliquis at home but now discontinued due to large intracranial hemorrhage.  Since that time family has decided to move forward with comfort care.  Subsequently palliative care was consulted and patient.  To transition out of ICU to assume care under the hospitalist service.     Review of systems cannot be obtained due to patient's mental status.     Hospice has been consulted and is now following. Patient asymptomatic at this time and does not meet criteria for GIP Hospice. Plan is home with hospice tomorrow morning at 10am. DME will be delivered to the home this evening: hospital bed, gel overlay, OBT Meds; SRX will be filled at [facility] today and  will deliver to the home.     Med rec completed, discharge summary provided.  Interval history / Subjective:   Patient seen and examined on rounds. Patient able to move extremities but will not open eyes and does not follow commands. Family at bedside. Family states patient has been comfortable

## 2025-01-24 NOTE — PROGRESS NOTES
[facility]  Good Help to Those in Need  [phone]    Hospice Nursing PRE-Admission   Discharge Summary  Patient Name: Jessica Pimentel  YOB: 1932  Age: 89 y.o.       Date of PLANNED Hospice Admission: 2024  Hospice Attending: Dr Kumar Goodwin  Primary Care Physician: Suzy Lobo MD     Home Hospice Address:  [address] [place] [address] [place] [address] [phone]    Primary Contact and Phone:  Kutr Pimentel, son  [phone]      ADVANCE CARE PLANNING    Code Status: DNR  Durable DNR: [x]  Yes  []  No      2025     1:00 AM   Demographics   Marital Status        Church: [employer]   Home: D    HOSPICE SUMMARY       For the Hospice Diagnosis of:   CVA  Admission nurse to get CTI  NCD: Assess on admission       CLINICAL INFORMATION   Allergies:   Allergies   Allergen Reactions    Codeine Other (See Comments)     Other reaction(s): Other (comments)   aggitation    aggitation         Currently this patient has:  none       Does patient have an AICD device:     [x] No              ASSESSMENT & PLAN     1. Symptom Issues Identified: restless, recent N/V, non-verbal, minimally responsive, does not open eyes    2. Spiritual Issues  Identified: assess on admission     3.  Psych/ Social/ Emotional Issues Identified: lives with her  who has dementia. She was driving and caring for him prior to ICH. Private duty care is already lined up for her . Her son will talk with [facility] about increasing hours to help with his mother.  ** HE IS WANTING  RESPITE ON  WHILE HE LINES UP MORE HELP.  [facility] AWARE, TRANSPORT WILL NEED TO  BE SET UP ON ADMISSION**             CARE COORDINATION           Hospice Consents: Signed uploaded and approved     2. DME Ordered/Company/Delivery Plan: [facility] delivering  5-8pm: hospital bed, gel overlay, OBT       3.   Unique home needs for safety: Bariatric patient  NO    4. Symptom Kit and other Medications

## 2025-01-24 NOTE — CARE COORDINATION
Transition of Care Plan:    RUR: 11%      CM met with patient son at bedside, he would like referral sent to [facility]. [facility]     Son is concerned about his father's, (patient's 's,)  reaction to patient returning home in her current state as she was alert/ oriented and driving yesterday 1/22/25--  has dementia and patient was his caregiver in the evening after the caregiver from [facility] leaves. Caretakers---patient's , who has dementia, is serviced by [facility] 7 days a week-- 8 hours a day to assist with basic needs. The caretakers assist with meals cooking and housekeeping.. If patient is discharged home, son will likely increase hours to meet patient's needs.       [facility]  Care Management Department  Ph:[phone]

## 2025-01-24 NOTE — CONSULTS
[address]  [address]                              CONSULTATION      PATIENT NAME: GERMAINE DOUGLAS                 : 1932  MED REC NO: [id number]                       ROOM: [facility]  ACCOUNT NO: [id number]                       ADMIT DATE: 2025  PROVIDER: Aneesh Thorne MD    DATE OF SERVICE:  2025    ATTENDING PHYSICIAN:  ALYSHA ANGULO    REASON FOR CONSULTATION:  Intracerebral hemorrhage.    HISTORY:  The patient is a 89-year-old female with a past medical history of atrial fibrillation, on Eliquis, hypertension, and hyperlipidemia.  She was at home and had a sudden onset of right-sided weakness and aphasia with systolic blood pressures of 200.  Head CT showed a large left frontoparietal parenchymal hemorrhage.  She has been minimally reactive with significant weakness of the right side.  Because of the head CT findings, I was called for evaluation.    PAST MEDICAL HISTORY:  As above.    HOME MEDICATIONS:  Eliquis, aspirin, Lipitor, Robaxin, Diovan.    ALLERGIES:  CODEINE.      SOCIAL HISTORY:  She does not smoke.    FAMILY HISTORY:  Noncontributory.    REVIEW OF SYSTEMS:  Unable to obtain.    PHYSICAL EXAMINATION:  NEUROLOGIC:  She is an elderly female, sitting, in no acute distress.  She has leftward gaze preference.  She is flaccid on the right side.  She does follow some simple commands in the left upper extremity.  Rest of neurologic exam is unable to obtain secondary to neurologic status.    IMAGING:  CT scan as above, shows large intracerebral hemorrhage.    IMPRESSION:  Intracerebral hemorrhage.    RECOMMENDATIONS:  I had a long discussion with the family.  At this point, I would not undergo a large surgery given her age and neurologic status.  They agree with that.  They want to progress to comfort measures, which I agree with as well.  I will be available as needed.        ANEESH THORNE MD      MTM/AQS  D:  2025  15:51:30  T:  01/23/2025 19:25:32  JOB #:  [id number]

## 2025-01-24 NOTE — PROGRESS NOTES
[facility]  Good Help to Those in Need  [phone]     Patient Name: Jessica Pimentel  YOB: 1932  Age: 89 y.o.    [facility] RN Note:  Hospice consult received, reviewing chart. Will follow up with Unit Nurse and Care Manager to discuss plan of care, patient status and discharge disposition within the hour.   Will plan to meet bedside with son at 11:30am    Thank you for the opportunity to be of service to this patient.     Thania Brito RN  Clinical Nurse Liaison  [facility]   [phone] Mobile  [phone] Office   Available on Perfect Serve

## 2025-01-24 NOTE — PROGRESS NOTES
[facility]  Good Help to Those in Need  [phone]    Patient Name: Jessica Pimentel  YOB: 1932  Age: 89 y.o.    [facility] RN Note:  Hospice consult noted. Chart reviewed. Plan of care discussed with patients nurse & care manager.   In to meet with son, Kurt.   Discussed Hospice philosophy, general plan of care, levels of care, services and on call procedures.  Family information packet provided & reviewed with Kurt.  Patient moving extremities, does not open eyes, non-verbal.  S/P ICH/SAH.  She is asymptomatic at this time and does not meet criteria for GIP Hospice.  She had been the caregiver along with privately hired help for her  who has dementia. Patient told his caregiver she wasn't feeling well and unfortunately suffered ICH.  Plan is home with hospice tomorrow morning.  CM to set transport for 10am  DME will be delivered to the home this evening: hospital bed, gel overlay, OBT  Meds; SRX will be filled at [facility] today and  will deliver to the home.  Dr Goodwin agrees with home hospice admission  Son would like her to go to [facility] on Sunday for respite stay while he tries to increase caregiver hours.    Thank you for the opportunity to be of service to this patient.     Thania Brito RN  Clinical Nurse Liaison  [facility]   [phone] Mobile  [phone] Office   Available on Perfect Serve

## 2025-01-25 VITALS
TEMPERATURE: 97.9 F | RESPIRATION RATE: 18 BRPM | OXYGEN SATURATION: 94 % | DIASTOLIC BLOOD PRESSURE: 77 MMHG | SYSTOLIC BLOOD PRESSURE: 143 MMHG | WEIGHT: 124.7 LBS | HEIGHT: 60 IN | BODY MASS INDEX: 24.48 KG/M2 | HEART RATE: 108 BPM

## 2025-01-25 NOTE — PROGRESS NOTES
[facility]  Good Help to Those in Need  [phone]     Patient Name: Jessica Pimentel  YOB: 1932  Age: 89 y.o.    [facility] RN Note:  Patient stable for transport. Bedside nurse gathering diapers, chux, and barrier cream to go with her. DDNR will go with transport. Discharge order placed.     Thank you for the opportunity to be of service to this patient.     Thania Brito RN  Clinical Nurse Liaison  [facility]   [phone] Mobile  [phone] Office   Available on Perfect Serve

## 2025-01-25 NOTE — DISCHARGE SUMMARY
Discharge Summary       PATIENT ID: Jessica Pimentel  MRN: [id number]   YOB: 1932    DATE OF ADMISSION: 1/22/2025  3:41 PM    DATE OF DISCHARGE: 1/25/25   PRIMARY CARE PROVIDER: Suzy Lobo MD     ATTENDING PHYSICIAN: DIPIKA ROD MD  DISCHARGING PROVIDER: Felisha Nguyen PA-C    To contact this individual call [phone] and ask the  to page.  If unavailable ask to be transferred the Adult Hospitalist Department.    CONSULTATIONS: IP CONSULT TO NEUROSURGERY  IP CONSULT TO NEUROSURGERY  IP CONSULT TO CASE MANAGEMENT  IP CONSULT TO NEUROLOGY  IP CONSULT TO CARDIOLOGY  IP CONSULT TO PALLIATIVE CARE  IP CONSULT TO CASE MANAGEMENT    PROCEDURES/SURGERIES: * No surgery found *     ADMITTING DIAGNOSES & HOSPITAL COURSE:     Jessica Pimentel is a 89 y.o. female who presents to the emergency room yesterday with right-sided weakness and aphasia subsequent workup in the ER with CT of the head revealed large left frontoparietal intraparenchymal hemorrhage with small amount of subarachnoid hemorrhage and 5 mm rightward shift.  Patient was subsequently admitted to ICU.  Since then patient has been evaluated by neurosurgery.  Neurology does not recommend any intervention given her age and neurology status.  Patient also was evaluated by neurology and no further workup was recommended.  Patient with known history of atrial fibrillation and was on Eliquis at home but now discontinued due to large intracranial hemorrhage.  Since that time family has decided to move forward with comfort care.  Subsequently palliative care was consulted and patient.  To transition out of ICU to assume care under the hospitalist service.     Review of systems cannot be obtained due to patient's mental status.     Hospice has been consulted and is now following. Patient asymptomatic at this time and does not meet criteria for GIP Hospice. Plan is home with hospice tomorrow morning at 10am. DME will be delivered to the home this

## 2025-03-17 ENCOUNTER — TELEPHONE (OUTPATIENT)
Age: 89
End: 2025-03-17

## 2025-03-17 NOTE — TELEPHONE ENCOUNTER
Call placed to Kurt Pimentel (patient's son).  Unable to to [facility] due to mailbox being full